# Patient Record
Sex: MALE | Race: WHITE | NOT HISPANIC OR LATINO | URBAN - METROPOLITAN AREA
[De-identification: names, ages, dates, MRNs, and addresses within clinical notes are randomized per-mention and may not be internally consistent; named-entity substitution may affect disease eponyms.]

---

## 2017-10-05 ENCOUNTER — APPOINTMENT (RX ONLY)
Dept: URBAN - METROPOLITAN AREA CLINIC 343 | Facility: CLINIC | Age: 82
Setting detail: DERMATOLOGY
End: 2017-10-05

## 2017-10-05 DIAGNOSIS — L82.1 OTHER SEBORRHEIC KERATOSIS: ICD-10-CM

## 2017-10-05 DIAGNOSIS — D18.0 HEMANGIOMA: ICD-10-CM

## 2017-10-05 DIAGNOSIS — L81.4 OTHER MELANIN HYPERPIGMENTATION: ICD-10-CM

## 2017-10-05 DIAGNOSIS — L57.0 ACTINIC KERATOSIS: ICD-10-CM

## 2017-10-05 DIAGNOSIS — D22 MELANOCYTIC NEVI: ICD-10-CM

## 2017-10-05 PROBLEM — D18.01 HEMANGIOMA OF SKIN AND SUBCUTANEOUS TISSUE: Status: ACTIVE | Noted: 2017-10-05

## 2017-10-05 PROBLEM — D48.5 NEOPLASM OF UNCERTAIN BEHAVIOR OF SKIN: Status: ACTIVE | Noted: 2017-10-05

## 2017-10-05 PROBLEM — D22.5 MELANOCYTIC NEVI OF TRUNK: Status: ACTIVE | Noted: 2017-10-05

## 2017-10-05 PROCEDURE — ? COUNSELING

## 2017-10-05 PROCEDURE — ? LIQUID NITROGEN

## 2017-10-05 PROCEDURE — 17003 DESTRUCT PREMALG LES 2-14: CPT

## 2017-10-05 PROCEDURE — 17000 DESTRUCT PREMALG LESION: CPT

## 2017-10-05 PROCEDURE — ? BIOPSY BY SHAVE METHOD

## 2017-10-05 PROCEDURE — 99213 OFFICE O/P EST LOW 20 MIN: CPT | Mod: 25

## 2017-10-05 PROCEDURE — 11100: CPT | Mod: 59

## 2017-10-05 ASSESSMENT — LOCATION SIMPLE DESCRIPTION DERM
LOCATION SIMPLE: NOSE
LOCATION SIMPLE: RIGHT FOREHEAD
LOCATION SIMPLE: CHEST
LOCATION SIMPLE: LEFT UPPER BACK
LOCATION SIMPLE: LEFT CHEEK

## 2017-10-05 ASSESSMENT — LOCATION DETAILED DESCRIPTION DERM
LOCATION DETAILED: LEFT MEDIAL SUPERIOR CHEST
LOCATION DETAILED: LEFT SUPERIOR UPPER BACK
LOCATION DETAILED: RIGHT FOREHEAD
LOCATION DETAILED: STERNUM
LOCATION DETAILED: LEFT MEDIAL INFERIOR CHEST
LOCATION DETAILED: LEFT LATERAL MANDIBULAR CHEEK
LOCATION DETAILED: NASAL INFRATIP
LOCATION DETAILED: LEFT CENTRAL MALAR CHEEK

## 2017-10-05 ASSESSMENT — LOCATION ZONE DERM
LOCATION ZONE: FACE
LOCATION ZONE: NOSE
LOCATION ZONE: TRUNK

## 2017-10-05 NOTE — PROCEDURE: LIQUID NITROGEN
Duration Of Freeze Thaw-Cycle (Seconds): 3
Render Post-Care Instructions In Note?: no
Post-Care Instructions: I reviewed with the patient in detail post-care instructions. Patient is to wear sunprotection, and avoid picking at any of the treated lesions. Pt may apply Vaseline to crusted or scabbing areas.
Number Of Freeze-Thaw Cycles: 3 freeze-thaw cycles
Consent: The patient's consent was obtained including but not limited to risks of crusting, scabbing, blistering, scarring, darker or lighter pigmentary change, recurrence, incomplete removal and infection.
Detail Level: Detailed

## 2017-10-05 NOTE — PROCEDURE: BIOPSY BY SHAVE METHOD
Post-Care Instructions: I reviewed with the patient in detail post-care instructions. Patient is to keep the biopsy site dry overnight, and then apply Aquaphor and a new bandage BID

## 2017-11-16 ENCOUNTER — IOP CHECK (OUTPATIENT)
Dept: URBAN - METROPOLITAN AREA CLINIC 43 | Facility: CLINIC | Age: 82
End: 2017-11-16

## 2017-11-16 DIAGNOSIS — H40.1131: ICD-10-CM

## 2017-11-16 PROCEDURE — 92083 EXTENDED VISUAL FIELD XM: CPT

## 2017-11-16 ASSESSMENT — VISUAL ACUITY
OS_CC: 20/25-2
OU_CC: 20/25-2
OD_CC: 20/30+2

## 2017-11-16 ASSESSMENT — TONOMETRY
OD_IOP_MMHG: 11
OS_IOP_MMHG: 13

## 2017-12-11 ENCOUNTER — EST. PATIENT EMERGENCY (OUTPATIENT)
Dept: URBAN - METROPOLITAN AREA CLINIC 43 | Facility: CLINIC | Age: 82
End: 2017-12-11

## 2017-12-11 DIAGNOSIS — H01.003: ICD-10-CM

## 2017-12-11 DIAGNOSIS — H00.023: ICD-10-CM

## 2017-12-11 DIAGNOSIS — H01.006: ICD-10-CM

## 2017-12-11 DIAGNOSIS — H00.026: ICD-10-CM

## 2017-12-11 PROCEDURE — 99212 OFFICE O/P EST SF 10 MIN: CPT

## 2017-12-11 ASSESSMENT — VISUAL ACUITY
OD_CC: 20/40
OS_CC: 20/30-1

## 2018-10-17 ENCOUNTER — APPOINTMENT (RX ONLY)
Dept: URBAN - METROPOLITAN AREA CLINIC 343 | Facility: CLINIC | Age: 83
Setting detail: DERMATOLOGY
End: 2018-10-17

## 2018-10-17 DIAGNOSIS — L21.8 OTHER SEBORRHEIC DERMATITIS: ICD-10-CM | Status: WELL CONTROLLED

## 2018-10-17 DIAGNOSIS — D22 MELANOCYTIC NEVI: ICD-10-CM

## 2018-10-17 DIAGNOSIS — L57.0 ACTINIC KERATOSIS: ICD-10-CM

## 2018-10-17 DIAGNOSIS — L82.1 OTHER SEBORRHEIC KERATOSIS: ICD-10-CM

## 2018-10-17 DIAGNOSIS — D18.0 HEMANGIOMA: ICD-10-CM

## 2018-10-17 DIAGNOSIS — L81.4 OTHER MELANIN HYPERPIGMENTATION: ICD-10-CM

## 2018-10-17 PROBLEM — Z85.828 PERSONAL HISTORY OF OTHER MALIGNANT NEOPLASM OF SKIN: Status: ACTIVE | Noted: 2018-10-17

## 2018-10-17 PROBLEM — I10 ESSENTIAL (PRIMARY) HYPERTENSION: Status: ACTIVE | Noted: 2018-10-17

## 2018-10-17 PROBLEM — E78.5 HYPERLIPIDEMIA, UNSPECIFIED: Status: ACTIVE | Noted: 2018-10-17

## 2018-10-17 PROBLEM — D18.01 HEMANGIOMA OF SKIN AND SUBCUTANEOUS TISSUE: Status: ACTIVE | Noted: 2018-10-17

## 2018-10-17 PROBLEM — I25.10 ATHEROSCLEROTIC HEART DISEASE OF NATIVE CORONARY ARTERY WITHOUT ANGINA PECTORIS: Status: ACTIVE | Noted: 2018-10-17

## 2018-10-17 PROBLEM — L85.3 XEROSIS CUTIS: Status: ACTIVE | Noted: 2018-10-17

## 2018-10-17 PROBLEM — N40.0 BENIGN PROSTATIC HYPERPLASIA WITHOUT LOWER URINARY TRACT SYMPTOMS: Status: ACTIVE | Noted: 2018-10-17

## 2018-10-17 PROBLEM — L29.8 OTHER PRURITUS: Status: ACTIVE | Noted: 2018-10-17

## 2018-10-17 PROBLEM — D48.5 NEOPLASM OF UNCERTAIN BEHAVIOR OF SKIN: Status: ACTIVE | Noted: 2018-10-17

## 2018-10-17 PROBLEM — D22.5 MELANOCYTIC NEVI OF TRUNK: Status: ACTIVE | Noted: 2018-10-17

## 2018-10-17 PROBLEM — K21.9 GASTRO-ESOPHAGEAL REFLUX DISEASE WITHOUT ESOPHAGITIS: Status: ACTIVE | Noted: 2018-10-17

## 2018-10-17 PROCEDURE — ? TREATMENT REGIMEN

## 2018-10-17 PROCEDURE — 99214 OFFICE O/P EST MOD 30 MIN: CPT | Mod: 25

## 2018-10-17 PROCEDURE — 17003 DESTRUCT PREMALG LES 2-14: CPT

## 2018-10-17 PROCEDURE — 17000 DESTRUCT PREMALG LESION: CPT

## 2018-10-17 PROCEDURE — ? PRESCRIPTION

## 2018-10-17 PROCEDURE — 11100: CPT | Mod: 59

## 2018-10-17 PROCEDURE — ? COUNSELING

## 2018-10-17 PROCEDURE — ? BIOPSY BY SHAVE METHOD

## 2018-10-17 PROCEDURE — ? LIQUID NITROGEN

## 2018-10-17 RX ORDER — HYDROCORTISONE 25 MG/ML
LOTION TOPICAL QD
Qty: 1 | Refills: 3 | Status: ERX

## 2018-10-17 ASSESSMENT — LOCATION DETAILED DESCRIPTION DERM
LOCATION DETAILED: LEFT FOREHEAD
LOCATION DETAILED: INFERIOR MID FOREHEAD
LOCATION DETAILED: RIGHT SUPERIOR MEDIAL UPPER BACK
LOCATION DETAILED: LEFT SUPERIOR FOREHEAD
LOCATION DETAILED: RIGHT SUPERIOR MEDIAL MIDBACK
LOCATION DETAILED: RIGHT CENTRAL MALAR CHEEK
LOCATION DETAILED: LEFT CENTRAL MALAR CHEEK
LOCATION DETAILED: RIGHT INFERIOR UPPER BACK
LOCATION DETAILED: LEFT SUPERIOR MEDIAL FOREHEAD
LOCATION DETAILED: SUPERIOR MID FOREHEAD
LOCATION DETAILED: RIGHT MID-UPPER BACK

## 2018-10-17 ASSESSMENT — LOCATION ZONE DERM
LOCATION ZONE: TRUNK
LOCATION ZONE: FACE

## 2018-10-17 ASSESSMENT — LOCATION SIMPLE DESCRIPTION DERM
LOCATION SIMPLE: RIGHT LOWER BACK
LOCATION SIMPLE: LEFT CHEEK
LOCATION SIMPLE: INFERIOR FOREHEAD
LOCATION SIMPLE: RIGHT UPPER BACK
LOCATION SIMPLE: SUPERIOR FOREHEAD
LOCATION SIMPLE: LEFT FOREHEAD
LOCATION SIMPLE: RIGHT CHEEK

## 2022-07-08 NOTE — PROCEDURE: TREATMENT REGIMEN
70
Initiate Treatment: Hydrocortisone 2.5% Lotion to apply as needed to cheeks and forehead (patient requested refill)
Detail Level: Zone

## 2022-10-11 ENCOUNTER — OFFICE VISIT (OUTPATIENT)
Dept: PRIMARY CARE CLINIC | Age: 87
End: 2022-10-11

## 2022-10-11 VITALS
OXYGEN SATURATION: 96 % | HEART RATE: 81 BPM | HEIGHT: 68 IN | WEIGHT: 157 LBS | RESPIRATION RATE: 16 BRPM | BODY MASS INDEX: 23.79 KG/M2 | DIASTOLIC BLOOD PRESSURE: 84 MMHG | SYSTOLIC BLOOD PRESSURE: 136 MMHG

## 2022-10-11 DIAGNOSIS — I73.9 PVD (PERIPHERAL VASCULAR DISEASE) WITH CLAUDICATION (HCC): ICD-10-CM

## 2022-10-11 DIAGNOSIS — N40.0 BENIGN PROSTATIC HYPERPLASIA WITHOUT LOWER URINARY TRACT SYMPTOMS: ICD-10-CM

## 2022-10-11 DIAGNOSIS — I65.23 BILATERAL CAROTID ARTERY STENOSIS: ICD-10-CM

## 2022-10-11 DIAGNOSIS — I25.10 CORONARY ARTERY DISEASE INVOLVING NATIVE CORONARY ARTERY OF NATIVE HEART WITHOUT ANGINA PECTORIS: ICD-10-CM

## 2022-10-11 DIAGNOSIS — I25.10 CORONARY ARTERY DISEASE INVOLVING NATIVE CORONARY ARTERY OF NATIVE HEART WITHOUT ANGINA PECTORIS: Primary | ICD-10-CM

## 2022-10-11 DIAGNOSIS — K21.9 GASTROESOPHAGEAL REFLUX DISEASE WITHOUT ESOPHAGITIS: ICD-10-CM

## 2022-10-11 LAB
A/G RATIO: 1.5 (ref 1.1–2.2)
ALBUMIN SERPL-MCNC: 4.4 G/DL (ref 3.4–5)
ALP BLD-CCNC: 108 U/L (ref 40–129)
ALT SERPL-CCNC: 10 U/L (ref 10–40)
ANION GAP SERPL CALCULATED.3IONS-SCNC: 11 MMOL/L (ref 3–16)
AST SERPL-CCNC: 11 U/L (ref 15–37)
BASOPHILS ABSOLUTE: 0 K/UL (ref 0–0.2)
BASOPHILS RELATIVE PERCENT: 0.5 %
BILIRUB SERPL-MCNC: 0.6 MG/DL (ref 0–1)
BUN BLDV-MCNC: 22 MG/DL (ref 7–20)
CALCIUM SERPL-MCNC: 10 MG/DL (ref 8.3–10.6)
CHLORIDE BLD-SCNC: 103 MMOL/L (ref 99–110)
CO2: 26 MMOL/L (ref 21–32)
CREAT SERPL-MCNC: 1.2 MG/DL (ref 0.8–1.3)
EOSINOPHILS ABSOLUTE: 0.2 K/UL (ref 0–0.6)
EOSINOPHILS RELATIVE PERCENT: 2.2 %
GFR AFRICAN AMERICAN: >60
GFR NON-AFRICAN AMERICAN: 57
GLUCOSE BLD-MCNC: 102 MG/DL (ref 70–99)
HCT VFR BLD CALC: 43.4 % (ref 40.5–52.5)
HEMOGLOBIN: 14.5 G/DL (ref 13.5–17.5)
LYMPHOCYTES ABSOLUTE: 1.8 K/UL (ref 1–5.1)
LYMPHOCYTES RELATIVE PERCENT: 20.4 %
MCH RBC QN AUTO: 29.8 PG (ref 26–34)
MCHC RBC AUTO-ENTMCNC: 33.3 G/DL (ref 31–36)
MCV RBC AUTO: 89.4 FL (ref 80–100)
MONOCYTES ABSOLUTE: 1 K/UL (ref 0–1.3)
MONOCYTES RELATIVE PERCENT: 11.3 %
NEUTROPHILS ABSOLUTE: 5.7 K/UL (ref 1.7–7.7)
NEUTROPHILS RELATIVE PERCENT: 65.6 %
PDW BLD-RTO: 14.1 % (ref 12.4–15.4)
PLATELET # BLD: 190 K/UL (ref 135–450)
PMV BLD AUTO: 9.8 FL (ref 5–10.5)
POTASSIUM SERPL-SCNC: 4.8 MMOL/L (ref 3.5–5.1)
RBC # BLD: 4.86 M/UL (ref 4.2–5.9)
SODIUM BLD-SCNC: 140 MMOL/L (ref 136–145)
TOTAL PROTEIN: 7.3 G/DL (ref 6.4–8.2)
TSH REFLEX: 2.62 UIU/ML (ref 0.27–4.2)
WBC # BLD: 8.7 K/UL (ref 4–11)

## 2022-10-11 PROCEDURE — 99204 OFFICE O/P NEW MOD 45 MIN: CPT | Performed by: FAMILY MEDICINE

## 2022-10-11 PROCEDURE — 1123F ACP DISCUSS/DSCN MKR DOCD: CPT | Performed by: FAMILY MEDICINE

## 2022-10-11 RX ORDER — EVENING PRIMROSE OIL 500 MG
400 CAPSULE ORAL EVERY OTHER DAY
COMMUNITY

## 2022-10-11 RX ORDER — CLOPIDOGREL BISULFATE 75 MG/1
TABLET ORAL
Qty: 30 TABLET | Refills: 3 | Status: SHIPPED | OUTPATIENT
Start: 2022-10-11 | End: 2022-11-04 | Stop reason: SDUPTHER

## 2022-10-11 RX ORDER — NITROGLYCERIN 0.4 MG/1
TABLET SUBLINGUAL
COMMUNITY

## 2022-10-11 RX ORDER — CILOSTAZOL 50 MG/1
50 TABLET ORAL
Qty: 60 TABLET | Refills: 3 | Status: SHIPPED | OUTPATIENT
Start: 2022-10-11 | End: 2022-11-04 | Stop reason: SDUPTHER

## 2022-10-11 RX ORDER — FAMOTIDINE 20 MG/1
TABLET, FILM COATED ORAL
COMMUNITY
Start: 2022-08-08 | End: 2022-11-02 | Stop reason: SDUPTHER

## 2022-10-11 RX ORDER — TAMSULOSIN HYDROCHLORIDE 0.4 MG/1
0.4 CAPSULE ORAL 2 TIMES DAILY
COMMUNITY
Start: 2022-06-30 | End: 2022-10-20 | Stop reason: SDUPTHER

## 2022-10-11 RX ORDER — CARVEDILOL 12.5 MG/1
TABLET ORAL
COMMUNITY
Start: 2022-06-16

## 2022-10-11 RX ORDER — FINASTERIDE 5 MG/1
TABLET, FILM COATED ORAL
COMMUNITY
Start: 2022-06-02

## 2022-10-11 RX ORDER — UBIDECARENONE 100 MG
200 CAPSULE ORAL DAILY
COMMUNITY

## 2022-10-11 RX ORDER — CILOSTAZOL 50 MG/1
50 TABLET ORAL
COMMUNITY
Start: 2021-08-20 | End: 2022-10-11 | Stop reason: SDUPTHER

## 2022-10-11 RX ORDER — CLOPIDOGREL BISULFATE 75 MG/1
TABLET ORAL
COMMUNITY
Start: 2022-09-27 | End: 2022-10-11 | Stop reason: SDUPTHER

## 2022-10-11 SDOH — ECONOMIC STABILITY: FOOD INSECURITY: WITHIN THE PAST 12 MONTHS, YOU WORRIED THAT YOUR FOOD WOULD RUN OUT BEFORE YOU GOT MONEY TO BUY MORE.: NEVER TRUE

## 2022-10-11 SDOH — ECONOMIC STABILITY: FOOD INSECURITY: WITHIN THE PAST 12 MONTHS, THE FOOD YOU BOUGHT JUST DIDN'T LAST AND YOU DIDN'T HAVE MONEY TO GET MORE.: NEVER TRUE

## 2022-10-11 ASSESSMENT — ENCOUNTER SYMPTOMS
DIARRHEA: 0
ABDOMINAL PAIN: 0
SORE THROAT: 0
SHORTNESS OF BREATH: 0
VOMITING: 0
TROUBLE SWALLOWING: 1
NAUSEA: 0
BLOOD IN STOOL: 0
COUGH: 0

## 2022-10-11 ASSESSMENT — PATIENT HEALTH QUESTIONNAIRE - PHQ9
SUM OF ALL RESPONSES TO PHQ QUESTIONS 1-9: 0
SUM OF ALL RESPONSES TO PHQ9 QUESTIONS 1 & 2: 0
2. FEELING DOWN, DEPRESSED OR HOPELESS: 0
SUM OF ALL RESPONSES TO PHQ QUESTIONS 1-9: 0
1. LITTLE INTEREST OR PLEASURE IN DOING THINGS: 0

## 2022-10-11 ASSESSMENT — SOCIAL DETERMINANTS OF HEALTH (SDOH): HOW HARD IS IT FOR YOU TO PAY FOR THE VERY BASICS LIKE FOOD, HOUSING, MEDICAL CARE, AND HEATING?: NOT HARD AT ALL

## 2022-10-11 NOTE — PROGRESS NOTES
Chief Complaint   Patient presents with    New Patient     NP to establish           Richelle Console is a 81 y.o. male who presents to be established    Patient has a past medical history significant for HTN and CAD s/p stenting 2006/2008 and is followed by Cardiology and is on coreg 12.5 mg BID along with plavix 75 mg daily. Pt is intolerable to statin medication. Pt reports a recent 2D Echo    Pt has a hx of PVD and is on pletal medication and is followed by Vascular Surgery    Pt has a hx of carotid stenosis s/p L carotid endarterectomy 3 years ago    Pt has a hx of BPH and is on proscar and flomax medications    Pt is on pepcid for GERD    Pt reports a normal colonoscopy except for a benign polyp 15 years ago    Pt denies any hx of diabestes, CHF or asthma    Surgical hx includes tonsillectomy and appendectomy    Pt did not get his COVID vaccine yet but did have COVID last year with mild symptoms    Pt is an exsmoker Quit 0817 and uses alcohol occasionally and denies any illegal drug use    FHx positive for colon cancer (mother) and lung cancer (father)    Review of Systems   Constitutional:  Negative for fatigue and fever. HENT:  Positive for trouble swallowing (at times and had endoscopy done which showed a stricture). Negative for congestion, nosebleeds and sore throat. Eyes:         Negative blurred vision or diplopia   Respiratory:  Negative for cough and shortness of breath. Cardiovascular:  Positive for leg swelling (ankle area at times). Negative for chest pain and palpitations. Positive intermittent claudication B/L legs at 1 mile   Gastrointestinal:  Negative for abdominal pain, blood in stool, diarrhea, nausea and vomiting. Negative melena; Positive indigestion at times   Endocrine: Negative for polydipsia and polyuria. Genitourinary:  Negative for dysuria and hematuria. Negative nocturia or hesitancy   Musculoskeletal:  Negative for arthralgias.    Skin:  Negative for rash.   Neurological:  Negative for dizziness, seizures, syncope, speech difficulty, weakness and headaches. Psychiatric/Behavioral:  Negative for dysphoric mood. The patient is not nervous/anxious. Vitals:    10/11/22 0757   BP: 136/84   Pulse: 81   Resp: 16   SpO2: 96%         Physical Exam  Vitals reviewed. Constitutional:       General: He is not in acute distress. HENT:      Mouth/Throat:      Mouth: Mucous membranes are moist.      Pharynx: Oropharynx is clear. Eyes:      General: No scleral icterus. Comments: Pink conjunctivae    Neck:      Thyroid: No thyromegaly. Cardiovascular:      Heart sounds: Murmur (2/6 systolic with radiation into B/L carotids) heard. No friction rub. No gallop. Pulmonary:      Effort: Pulmonary effort is normal.      Breath sounds: Normal breath sounds. Abdominal:      Palpations: Abdomen is soft. Tenderness: There is no abdominal tenderness. Musculoskeletal:      Comments: No leg edema noted B/L   Lymphadenopathy:      Cervical: No cervical adenopathy. Skin:     Findings: No rash. Neurological:      Mental Status: He is alert. Comments: Cranial nerves 2 - 12 grossly intact; Muscle strength 5/5 throughout   Psychiatric:         Mood and Affect: Mood normal.       ASSESSMENT AND PLAN    1. Coronary artery disease involving native coronary artery of native heart without angina pectoris  Pt is followed by Cardiology and is clinically stable on present medications and denies any CP or SOB and need to obtain recent correspondence and 2D Echo report for review  - clopidogrel (PLAVIX) 75 MG tablet; clopidogrel 75 mg tablet  Dispense: 30 tablet; Refill: 3  - Comprehensive Metabolic Panel; Future  - TSH with Reflex; Future    2. PVD (peripheral vascular disease) with claudication (HCC)  Pt is followed by Vascular Surgery and is clinically stable on present medications and will check basic bloodwork  - cilostazol (PLETAL) 50 MG tablet;  Take 1 tablet by mouth 2 times daily (before meals)  Dispense: 60 tablet; Refill: 3  - CBC with Auto Differential; Future  - Comprehensive Metabolic Panel; Future  - TSH with Reflex; Future    3. Bilateral carotid artery stenosis  Patient s/p L carotid endarterectomy and is clinically stable on present medications and with a nonfocal neuro exam  - clopidogrel (PLAVIX) 75 MG tablet; clopidogrel 75 mg tablet  Dispense: 30 tablet; Refill: 3    4. Benign prostatic hyperplasia without lower urinary tract symptoms  Patient clinically stable on present medications    5. Gastroesophageal reflux disease without esophagitis  Patient clinically stable on present medications and is with a nontender abdomen on PE  - CBC with Auto Differential; Future      Moiz Alexis MD      Return in about 2 months (around 12/11/2022). Patient Instructions     Go to the ER ASAP if you develop any chest pain, shortness of breath, facial droop, slurred speech, weakness/numbness in your arms or legs or double vision!

## 2022-10-20 RX ORDER — TAMSULOSIN HYDROCHLORIDE 0.4 MG/1
0.4 CAPSULE ORAL 2 TIMES DAILY
Qty: 180 CAPSULE | Refills: 0 | Status: SHIPPED | OUTPATIENT
Start: 2022-10-20

## 2022-10-20 NOTE — TELEPHONE ENCOUNTER
Medication:   Requested Prescriptions     Pending Prescriptions Disp Refills    tamsulosin (FLOMAX) 0.4 MG capsule 180 capsule 0     Sig: Take 1 capsule by mouth in the morning and at bedtime        Last Filled:  06.30.2022 - historical listing    Patient Phone Number: [phone] (home)     Last appt: 10.11.2022  Next appt: 12.13.2022    Last OARRS: No flowsheet data found.

## 2022-11-02 RX ORDER — FAMOTIDINE 20 MG/1
20 TABLET, FILM COATED ORAL 2 TIMES DAILY
Qty: 60 TABLET | Refills: 2 | Status: SHIPPED | OUTPATIENT
Start: 2022-11-02

## 2022-11-02 NOTE — TELEPHONE ENCOUNTER
Medication:   Requested Prescriptions     Pending Prescriptions Disp Refills    famotidine (PEPCID) 20 MG tablet 60 tablet         Last Filled:  hist provider     Patient Phone Number: [phone] (home)     Last appt: 10/11/2022  Next appt: 12/13/2022    Last OARRS: No flowsheet data found.

## 2022-11-04 DIAGNOSIS — I65.23 BILATERAL CAROTID ARTERY STENOSIS: ICD-10-CM

## 2022-11-04 DIAGNOSIS — I73.9 PVD (PERIPHERAL VASCULAR DISEASE) WITH CLAUDICATION (HCC): ICD-10-CM

## 2022-11-04 DIAGNOSIS — I25.10 CORONARY ARTERY DISEASE INVOLVING NATIVE CORONARY ARTERY OF NATIVE HEART WITHOUT ANGINA PECTORIS: ICD-10-CM

## 2022-11-04 RX ORDER — CLOPIDOGREL BISULFATE 75 MG/1
TABLET ORAL
Qty: 90 TABLET | Refills: 1 | Status: SHIPPED | OUTPATIENT
Start: 2022-11-04

## 2022-11-04 RX ORDER — CILOSTAZOL 50 MG/1
50 TABLET ORAL
Qty: 180 TABLET | Refills: 1 | Status: SHIPPED | OUTPATIENT
Start: 2022-11-04

## 2022-11-04 NOTE — TELEPHONE ENCOUNTER
Medication:   Requested Prescriptions     Pending Prescriptions Disp Refills    clopidogrel (PLAVIX) 75 MG tablet 90 tablet 3     Sig: clopidogrel 75 mg tablet        Last Filled:  10/11/2022 - pt requesting 90 day prescription    Patient Phone Number: [phone] (home)     Last appt: Visit date not found   Next appt: Visit date not found    Last OARRS: No flowsheet data found.

## 2022-11-09 ENCOUNTER — TELEPHONE (OUTPATIENT)
Dept: PRIMARY CARE CLINIC | Age: 87
End: 2022-11-09

## 2022-11-09 NOTE — TELEPHONE ENCOUNTER
Monica Shoemaker with [facility] ([phone]) is calling to get clarification on the directions for medication below:    clopidogrel (PLAVIX) 75 MG tablet  (No Directions?)      Please clarify frequency

## 2022-12-13 ENCOUNTER — OFFICE VISIT (OUTPATIENT)
Dept: PRIMARY CARE CLINIC | Age: 87
End: 2022-12-13
Payer: COMMERCIAL

## 2022-12-13 VITALS
BODY MASS INDEX: 23.64 KG/M2 | HEART RATE: 80 BPM | RESPIRATION RATE: 16 BRPM | HEIGHT: 68 IN | OXYGEN SATURATION: 98 % | DIASTOLIC BLOOD PRESSURE: 70 MMHG | SYSTOLIC BLOOD PRESSURE: 140 MMHG | TEMPERATURE: 97 F | WEIGHT: 156 LBS

## 2022-12-13 DIAGNOSIS — H93.13 TINNITUS OF BOTH EARS: ICD-10-CM

## 2022-12-13 DIAGNOSIS — K21.9 GASTROESOPHAGEAL REFLUX DISEASE WITHOUT ESOPHAGITIS: ICD-10-CM

## 2022-12-13 DIAGNOSIS — I73.9 PVD (PERIPHERAL VASCULAR DISEASE) WITH CLAUDICATION (HCC): ICD-10-CM

## 2022-12-13 DIAGNOSIS — N40.0 BENIGN PROSTATIC HYPERPLASIA WITHOUT LOWER URINARY TRACT SYMPTOMS: ICD-10-CM

## 2022-12-13 DIAGNOSIS — I25.10 CORONARY ARTERY DISEASE INVOLVING NATIVE CORONARY ARTERY OF NATIVE HEART WITHOUT ANGINA PECTORIS: ICD-10-CM

## 2022-12-13 DIAGNOSIS — I65.23 BILATERAL CAROTID ARTERY STENOSIS: Primary | ICD-10-CM

## 2022-12-13 PROCEDURE — 1123F ACP DISCUSS/DSCN MKR DOCD: CPT | Performed by: FAMILY MEDICINE

## 2022-12-13 PROCEDURE — 99214 OFFICE O/P EST MOD 30 MIN: CPT | Performed by: FAMILY MEDICINE

## 2022-12-13 ASSESSMENT — ENCOUNTER SYMPTOMS
VOMITING: 0
BLOOD IN STOOL: 0
COUGH: 0
TROUBLE SWALLOWING: 1
SHORTNESS OF BREATH: 0
ABDOMINAL PAIN: 0
NAUSEA: 0
SORE THROAT: 0

## 2022-12-13 NOTE — PROGRESS NOTES
Chief Complaint   Patient presents with    Follow-up     Pt is here for 2 month f/u          Urszula Ruano is a 81 y.o. male who presents for routine visit    Patient has a past medical history significant for HTN and CAD s/p stenting 2006/2008 and is followed by Cardiology and is on coreg 12.5 mg BID along with plavix 75 mg daily. Pt is intolerable to statin medication. Pt reports a recent 2D Echo and had a cardiac stress test 09/2021 which was normal    Patient did recent bloodwork [CMP, TSH, CBC] which was within normal limits except for signs of dehydration and patient thus needs to drink at least 6 -8 glasses of water daily and will repeat BMP bloodwork at next office visit     Pt has a hx of PVD and is on pletal medication and is followed by Vascular Surgery     Pt has a hx of carotid stenosis s/p L carotid endarterectomy 3 years ago     Pt has a hx of BPH and is on proscar and flomax medications     Pt is on pepcid for GERD     Pt reports a normal colonoscopy except for a benign polyp 15 years ago     Pt denies any hx of diabestes, CHF or asthma     Surgical hx includes tonsillectomy and appendectomy     Pt did not get his COVID vaccine yet but did have COVID last year with mild symptoms     Pt is an exsmoker Quit 1976 and uses alcohol occasionally and denies any illegal drug use     FHx positive for colon cancer (mother) and lung cancer (father)        Review of Systems   Constitutional:  Negative for fatigue and fever. HENT:  Positive for trouble swallowing (at times and had endoscopy done which showed a stricture). Negative for congestion, nosebleeds and sore throat. Eyes:         Negative blurred vision or diplopia   Respiratory:  Negative for cough and shortness of breath. Cardiovascular:  Positive for chest pain (sharp and brief at times) and leg swelling (ankle area at times). Negative for palpitations.         Positive intermittent claudication B/L legs at 1 mile   Gastrointestinal:  Negative for abdominal pain, blood in stool, nausea and vomiting. Negative melena; Positive indigestion at times   Endocrine: Negative for polydipsia and polyuria. Genitourinary:  Negative for dysuria and hematuria. Negative nocturia or hesitancy   Musculoskeletal:  Negative for arthralgias. Skin:  Positive for rash (dermatitis on face and uses Rx steroid cream as needed). Neurological:  Negative for dizziness, seizures, syncope, speech difficulty, weakness and headaches. Psychiatric/Behavioral:  Negative for dysphoric mood. The patient is not nervous/anxious. Vitals:    12/13/22 1114   BP: (!) 140/70   Pulse: 80   Resp: 16   Temp: 97 °F (36.1 °C)   SpO2: 98%         Physical Exam  Vitals reviewed. Constitutional:       General: He is not in acute distress. HENT:      Mouth/Throat:      Mouth: Mucous membranes are moist.      Pharynx: Oropharynx is clear. Eyes:      General: No scleral icterus. Comments: Pink conjunctivae    Neck:      Thyroid: No thyromegaly. Cardiovascular:      Heart sounds: Murmur (2/6 systolic with radiation into B/L carotids) heard. No friction rub. No gallop. Pulmonary:      Effort: Pulmonary effort is normal.      Breath sounds: Normal breath sounds. Abdominal:      Palpations: Abdomen is soft. Tenderness: There is no abdominal tenderness. Musculoskeletal:      Comments: No leg edema noted B/L   Lymphadenopathy:      Cervical: No cervical adenopathy. Skin:     Findings: No rash. Neurological:      Mental Status: He is alert. Comments: Cranial nerves 2 - 12 grossly intact; Muscle strength 5/5 throughout   Psychiatric:         Mood and Affect: Mood normal.       ASSESSMENT AND PLAN    1. Bilateral carotid artery stenosis  Patient s/p L carotid endarterectomy and is clinically stable on present medications and with a nonfocal neuro exam    2.  Coronary artery disease involving native coronary artery of native heart without angina pectoris  Pt is followed by Cardiology and is clinically stable on present medication and had a cardiac stress test 09/2021 which was normal  - Basic Metabolic Panel; Future    3. PVD (peripheral vascular disease) with claudication (HCC)  Pt is followed by Vascular Surgery and is clinically stable on present medications and had recent normal CBC bloodwork    4. Gastroesophageal reflux disease without esophagitis  Patient clinically stable on present medications and is with a nontender abdomen on PE    5. Benign prostatic hyperplasia without lower urinary tract symptoms  Patient clinically stable on present medications    6. Tinnitus of both ears  Will Refer pt to ENT for evaluation  - Vira Prado DO, Otolaryngology, Rodri Gil MD      Return in about 3 months (around 3/13/2023). Patient Instructions     Go to the ER ASAP if you develop any chest pain, shortness of breath, facial droop, slurred speech, weakness/numbness in your arms or legs or double vision!

## 2023-01-03 RX ORDER — CARVEDILOL 12.5 MG/1
12.5 TABLET ORAL 2 TIMES DAILY
Qty: 180 TABLET | Refills: 1 | Status: SHIPPED | OUTPATIENT
Start: 2023-01-03

## 2023-01-03 RX ORDER — FINASTERIDE 5 MG/1
5 TABLET, FILM COATED ORAL DAILY
Qty: 90 TABLET | Refills: 1 | Status: SHIPPED | OUTPATIENT
Start: 2023-01-03

## 2023-01-03 NOTE — TELEPHONE ENCOUNTER
Medication:   Requested Prescriptions     Pending Prescriptions Disp Refills    carvedilol (COREG) 12.5 MG tablet 180 tablet 1     Sig: Take 1 tablet by mouth 2 times daily    finasteride (PROSCAR) 5 MG tablet 90 tablet 1     Sig: Take 1 tablet by mouth daily        Last Filled:  6/16/2022 and 6/2/2022 from previous provider    Patient Phone Number: [phone] (home)     Last appt: 12/13/2022   Next appt: 3/14/2023    Last OARRS: No flowsheet data found.

## 2023-01-09 ENCOUNTER — OFFICE VISIT (OUTPATIENT)
Dept: PRIMARY CARE CLINIC | Age: 88
End: 2023-01-09
Payer: MEDICARE

## 2023-01-09 VITALS
WEIGHT: 166 LBS | TEMPERATURE: 97.9 F | DIASTOLIC BLOOD PRESSURE: 70 MMHG | HEART RATE: 74 BPM | BODY MASS INDEX: 25.16 KG/M2 | HEIGHT: 68 IN | SYSTOLIC BLOOD PRESSURE: 132 MMHG | OXYGEN SATURATION: 96 % | RESPIRATION RATE: 18 BRPM

## 2023-01-09 DIAGNOSIS — K21.9 GASTROESOPHAGEAL REFLUX DISEASE WITHOUT ESOPHAGITIS: Primary | ICD-10-CM

## 2023-01-09 DIAGNOSIS — R10.13 EPIGASTRIC PAIN: ICD-10-CM

## 2023-01-09 PROCEDURE — G8427 DOCREV CUR MEDS BY ELIG CLIN: HCPCS | Performed by: FAMILY MEDICINE

## 2023-01-09 PROCEDURE — G8417 CALC BMI ABV UP PARAM F/U: HCPCS | Performed by: FAMILY MEDICINE

## 2023-01-09 PROCEDURE — G8484 FLU IMMUNIZE NO ADMIN: HCPCS | Performed by: FAMILY MEDICINE

## 2023-01-09 PROCEDURE — 99213 OFFICE O/P EST LOW 20 MIN: CPT | Performed by: FAMILY MEDICINE

## 2023-01-09 PROCEDURE — 1123F ACP DISCUSS/DSCN MKR DOCD: CPT | Performed by: FAMILY MEDICINE

## 2023-01-09 PROCEDURE — 1036F TOBACCO NON-USER: CPT | Performed by: FAMILY MEDICINE

## 2023-01-09 RX ORDER — LANSOPRAZOLE 30 MG/1
30 CAPSULE, DELAYED RELEASE ORAL DAILY
Qty: 30 CAPSULE | Refills: 1 | Status: SHIPPED | OUTPATIENT
Start: 2023-01-09

## 2023-01-09 RX ORDER — LANSOPRAZOLE 30 MG/1
30 CAPSULE, DELAYED RELEASE ORAL DAILY
Qty: 30 CAPSULE | Refills: 3 | Status: CANCELLED | OUTPATIENT
Start: 2023-01-09

## 2023-01-09 ASSESSMENT — ENCOUNTER SYMPTOMS
SHORTNESS OF BREATH: 0
COUGH: 1
ABDOMINAL PAIN: 0
VOMITING: 0
SORE THROAT: 0
TROUBLE SWALLOWING: 1
NAUSEA: 0
BLOOD IN STOOL: 0

## 2023-01-09 ASSESSMENT — PATIENT HEALTH QUESTIONNAIRE - PHQ9
SUM OF ALL RESPONSES TO PHQ QUESTIONS 1-9: 0
2. FEELING DOWN, DEPRESSED OR HOPELESS: 0
SUM OF ALL RESPONSES TO PHQ QUESTIONS 1-9: 0
SUM OF ALL RESPONSES TO PHQ9 QUESTIONS 1 & 2: 0
1. LITTLE INTEREST OR PLEASURE IN DOING THINGS: 0
SUM OF ALL RESPONSES TO PHQ QUESTIONS 1-9: 0
SUM OF ALL RESPONSES TO PHQ QUESTIONS 1-9: 0

## 2023-01-09 NOTE — PROGRESS NOTES
Chief Complaint   Patient presents with    Gastroesophageal Reflux     Worsening heart burn X one week         Adan Kid is a 81 y.o. male who presents complaining of indigestion daily over the past week despite taking his pepcid medication    Patient has a past medical history significant for HTN and CAD s/p stenting 2006/2008 and is followed by Cardiology and is on coreg 12.5 mg BID along with plavix 75 mg daily. Pt is intolerable to statin medication. Pt reports a recent 2D Echo and had a cardiac stress test 09/2021 which was normal     Patient did recent bloodwork [CMP, TSH, CBC] which was within normal limits except for signs of dehydration and patient thus needs to drink at least 6 -8 glasses of water daily and will repeat BMP bloodwork at next office visit     Pt has a hx of PVD and is on pletal medication and is followed by Vascular Surgery     Pt has a hx of carotid stenosis s/p L carotid endarterectomy 3 years ago     Pt has a hx of BPH and is on proscar and flomax medications     Pt is on pepcid for GERD     Pt reports a normal colonoscopy except for a benign polyp 15 years ago     Pt denies any hx of diabestes, CHF or asthma     Surgical hx includes tonsillectomy and appendectomy     Pt did not get his COVID vaccine yet but did have COVID last year with mild symptoms     Pt is an exsmoker Quit 1976 and uses alcohol occasionally and denies any illegal drug use     FHx positive for colon cancer (mother) and lung cancer (father)        Review of Systems   Constitutional:  Negative for fever. HENT:  Positive for trouble swallowing (at times and had endoscopy done which showed a stricture). Negative for nosebleeds and sore throat. Eyes:         Negative blurred vision or diplopia   Respiratory:  Positive for cough (dry). Negative for shortness of breath. Cardiovascular:  Positive for chest pain (sharp and brief at times) and leg swelling (ankle area at times). Negative for palpitations. Positive intermittent claudication B/L legs at 1 mile   Gastrointestinal:  Negative for abdominal pain, blood in stool, nausea and vomiting. Negative melena; Positive indigestion daily x 2 weeks   Genitourinary:  Positive for urgency (at times). Negative for dysuria and hematuria. Skin:  Negative for rash. Neurological:  Negative for dizziness, seizures, syncope and headaches. Psychiatric/Behavioral:  Negative for dysphoric mood. The patient is not nervous/anxious. Vitals:    01/09/23 1551   BP: 132/70   Pulse: 74   Resp: 18   Temp: 97.9 °F (36.6 °C)   SpO2: 96%         Physical Exam  Vitals reviewed. Constitutional:       General: He is not in acute distress. HENT:      Mouth/Throat:      Mouth: Mucous membranes are moist.      Pharynx: Oropharynx is clear. Eyes:      General: No scleral icterus. Comments: Pink conjunctivae    Neck:      Thyroid: No thyromegaly. Cardiovascular:      Heart sounds: Murmur (2/6 systolic with radiation into B/L carotids) heard. No friction rub. No gallop. Pulmonary:      Effort: Pulmonary effort is normal.      Breath sounds: Normal breath sounds. Abdominal:      Palpations: Abdomen is soft. Tenderness: There is abdominal tenderness (mild in midepigastric and RUQ areas). There is no guarding or rebound. Musculoskeletal:      Comments: No leg edema noted B/L   Lymphadenopathy:      Cervical: No cervical adenopathy. Skin:     Findings: No rash. Neurological:      Mental Status: He is alert. Psychiatric:         Mood and Affect: Mood normal.       ASSESSMENT AND PLAN    1. Gastroesophageal reflux disease without esophagitis/Epigastric pain  Will discontinue pepcid and treat pt with a PPI medication for better control and patient was told to notify MD if you note no improvement in 5 -7 days.  Will check an abdominal US given noted mid epigastric and RUQ tendernous to palpation on abdominal exam. VSS   - lansoprazole (PREVACID) 30 MG delayed release capsule; Take 1 capsule by mouth daily  Dispense: 30 capsule; Refill: 1  - US ABDOMEN COMPLETE; Future      Von President, MD      Return in about 2 months (around 3/9/2023). Patient Instructions     Go to the ER ASAP if you develop any vomiting or fever!

## 2023-01-26 ENCOUNTER — HOSPITAL ENCOUNTER (OUTPATIENT)
Dept: ULTRASOUND IMAGING | Age: 88
Discharge: HOME | End: 2023-01-26
Payer: MEDICARE

## 2023-01-26 DIAGNOSIS — R10.13 EPIGASTRIC PAIN: ICD-10-CM

## 2023-01-26 DIAGNOSIS — K21.9 GASTROESOPHAGEAL REFLUX DISEASE WITHOUT ESOPHAGITIS: ICD-10-CM

## 2023-01-26 PROCEDURE — 76700 US EXAM ABDOM COMPLETE: CPT

## 2023-02-21 DIAGNOSIS — I25.10 CORONARY ARTERY DISEASE INVOLVING NATIVE CORONARY ARTERY OF NATIVE HEART WITHOUT ANGINA PECTORIS: ICD-10-CM

## 2023-02-21 DIAGNOSIS — R10.13 EPIGASTRIC PAIN: ICD-10-CM

## 2023-02-21 DIAGNOSIS — K21.9 GASTROESOPHAGEAL REFLUX DISEASE WITHOUT ESOPHAGITIS: ICD-10-CM

## 2023-02-21 DIAGNOSIS — I65.23 BILATERAL CAROTID ARTERY STENOSIS: ICD-10-CM

## 2023-02-21 RX ORDER — FAMOTIDINE 20 MG/1
20 TABLET, FILM COATED ORAL 2 TIMES DAILY
Qty: 180 TABLET | Refills: 1 | OUTPATIENT
Start: 2023-02-21

## 2023-02-21 RX ORDER — TAMSULOSIN HYDROCHLORIDE 0.4 MG/1
0.4 CAPSULE ORAL 2 TIMES DAILY
Qty: 180 CAPSULE | Refills: 1 | Status: SHIPPED | OUTPATIENT
Start: 2023-02-21

## 2023-02-21 RX ORDER — CLOPIDOGREL BISULFATE 75 MG/1
TABLET ORAL
Qty: 90 TABLET | Refills: 1 | Status: SHIPPED | OUTPATIENT
Start: 2023-02-21

## 2023-02-21 RX ORDER — LANSOPRAZOLE 30 MG/1
30 CAPSULE, DELAYED RELEASE ORAL DAILY
Qty: 90 CAPSULE | Refills: 1 | Status: SHIPPED | OUTPATIENT
Start: 2023-02-21

## 2023-02-21 NOTE — TELEPHONE ENCOUNTER
Medication:   Requested Prescriptions     Pending Prescriptions Disp Refills    clopidogrel (PLAVIX) 75 MG tablet 90 tablet 1     Sig: clopidogrel 75 mg tablet    lansoprazole (PREVACID) 30 MG delayed release capsule 90 capsule 1     Sig: Take 1 capsule by mouth daily    famotidine (PEPCID) 20 MG tablet 180 tablet 1     Sig: Take 1 tablet by mouth 2 times daily    tamsulosin (FLOMAX) 0.4 MG capsule 180 capsule 0     Sig: Take 1 capsule by mouth in the morning and at bedtime        Last Filled:  pt changing to mail in    Patient Phone Number: [phone] (home)     Last appt: 1/9/2023   Next appt: 3/16/2023    Last OARRS: No flowsheet data found.

## 2023-03-15 RX ORDER — FINASTERIDE 5 MG/1
5 TABLET, FILM COATED ORAL DAILY
Qty: 100 TABLET | Refills: 1 | Status: SHIPPED | OUTPATIENT
Start: 2023-03-15

## 2023-03-15 RX ORDER — CARVEDILOL 12.5 MG/1
TABLET ORAL
Qty: 200 TABLET | Refills: 1 | Status: SHIPPED | OUTPATIENT
Start: 2023-03-15

## 2023-04-17 DIAGNOSIS — I73.9 PVD (PERIPHERAL VASCULAR DISEASE) WITH CLAUDICATION (HCC): ICD-10-CM

## 2023-04-17 RX ORDER — CILOSTAZOL 50 MG/1
50 TABLET ORAL
Qty: 180 TABLET | Refills: 1 | Status: SHIPPED | OUTPATIENT
Start: 2023-04-17

## 2023-04-17 NOTE — TELEPHONE ENCOUNTER
Medication:   Requested Prescriptions     Pending Prescriptions Disp Refills    cilostazol (PLETAL) 50 MG tablet 180 tablet 1     Sig: Take 1 tablet by mouth 2 times daily (before meals)        Last Filled:  11/4/2022, #180 with 1 refill    Patient Phone Number: [phone] (home)     Last appt: 1/9/2023   Next appt: Visit date not found    Last OARRS: No flowsheet data found.

## 2023-04-17 NOTE — TELEPHONE ENCOUNTER
Patient requesting a medication refill.     Medication CILOSTAZOL  Dosage  50 mg tab   Frequency 2 times daily before meals   Pharmacy  [facility]   Next office visit  none

## 2023-05-01 DIAGNOSIS — R10.13 EPIGASTRIC PAIN: ICD-10-CM

## 2023-05-01 DIAGNOSIS — K21.9 GASTROESOPHAGEAL REFLUX DISEASE WITHOUT ESOPHAGITIS: ICD-10-CM

## 2023-05-01 DIAGNOSIS — I25.10 CORONARY ARTERY DISEASE INVOLVING NATIVE CORONARY ARTERY OF NATIVE HEART WITHOUT ANGINA PECTORIS: ICD-10-CM

## 2023-05-01 DIAGNOSIS — I65.23 BILATERAL CAROTID ARTERY STENOSIS: ICD-10-CM

## 2023-05-01 RX ORDER — LANSOPRAZOLE 30 MG/1
30 CAPSULE, DELAYED RELEASE ORAL DAILY
Qty: 100 CAPSULE | Refills: 2 | OUTPATIENT
Start: 2023-05-01

## 2023-05-01 RX ORDER — CLOPIDOGREL BISULFATE 75 MG/1
TABLET ORAL
Qty: 100 TABLET | Refills: 2 | OUTPATIENT
Start: 2023-05-01

## 2023-05-01 RX ORDER — TAMSULOSIN HYDROCHLORIDE 0.4 MG/1
0.4 CAPSULE ORAL 2 TIMES DAILY
Qty: 200 CAPSULE | Refills: 2 | OUTPATIENT
Start: 2023-05-01

## 2023-05-04 ENCOUNTER — OFFICE VISIT (OUTPATIENT)
Dept: PRIMARY CARE CLINIC | Age: 88
End: 2023-05-04
Payer: MEDICARE

## 2023-05-04 VITALS
HEART RATE: 72 BPM | WEIGHT: 159 LBS | DIASTOLIC BLOOD PRESSURE: 60 MMHG | RESPIRATION RATE: 16 BRPM | HEIGHT: 68 IN | OXYGEN SATURATION: 96 % | BODY MASS INDEX: 24.1 KG/M2 | TEMPERATURE: 97.1 F | SYSTOLIC BLOOD PRESSURE: 98 MMHG

## 2023-05-04 DIAGNOSIS — Z00.00 WELCOME TO MEDICARE PREVENTIVE VISIT: Primary | ICD-10-CM

## 2023-05-04 DIAGNOSIS — N40.0 BENIGN PROSTATIC HYPERPLASIA WITHOUT LOWER URINARY TRACT SYMPTOMS: ICD-10-CM

## 2023-05-04 DIAGNOSIS — I25.10 CORONARY ARTERY DISEASE INVOLVING NATIVE CORONARY ARTERY OF NATIVE HEART WITHOUT ANGINA PECTORIS: ICD-10-CM

## 2023-05-04 DIAGNOSIS — I65.23 BILATERAL CAROTID ARTERY STENOSIS: ICD-10-CM

## 2023-05-04 DIAGNOSIS — I73.9 PVD (PERIPHERAL VASCULAR DISEASE) WITH CLAUDICATION (HCC): ICD-10-CM

## 2023-05-04 DIAGNOSIS — K21.9 GASTROESOPHAGEAL REFLUX DISEASE WITHOUT ESOPHAGITIS: ICD-10-CM

## 2023-05-04 PROCEDURE — 1123F ACP DISCUSS/DSCN MKR DOCD: CPT | Performed by: FAMILY MEDICINE

## 2023-05-04 PROCEDURE — G0402 INITIAL PREVENTIVE EXAM: HCPCS | Performed by: FAMILY MEDICINE

## 2023-05-04 SDOH — ECONOMIC STABILITY: FOOD INSECURITY: WITHIN THE PAST 12 MONTHS, YOU WORRIED THAT YOUR FOOD WOULD RUN OUT BEFORE YOU GOT MONEY TO BUY MORE.: NEVER TRUE

## 2023-05-04 SDOH — ECONOMIC STABILITY: HOUSING INSECURITY
IN THE LAST 12 MONTHS, WAS THERE A TIME WHEN YOU DID NOT HAVE A STEADY PLACE TO SLEEP OR SLEPT IN A SHELTER (INCLUDING NOW)?: NO

## 2023-05-04 SDOH — ECONOMIC STABILITY: FOOD INSECURITY: WITHIN THE PAST 12 MONTHS, THE FOOD YOU BOUGHT JUST DIDN'T LAST AND YOU DIDN'T HAVE MONEY TO GET MORE.: NEVER TRUE

## 2023-05-04 SDOH — ECONOMIC STABILITY: INCOME INSECURITY: HOW HARD IS IT FOR YOU TO PAY FOR THE VERY BASICS LIKE FOOD, HOUSING, MEDICAL CARE, AND HEATING?: NOT HARD AT ALL

## 2023-05-04 ASSESSMENT — ENCOUNTER SYMPTOMS
SORE THROAT: 0
SHORTNESS OF BREATH: 0
BLOOD IN STOOL: 0
VOMITING: 0
COUGH: 0
NAUSEA: 0
TROUBLE SWALLOWING: 1
ABDOMINAL PAIN: 0

## 2023-05-04 ASSESSMENT — PATIENT HEALTH QUESTIONNAIRE - PHQ9
1. LITTLE INTEREST OR PLEASURE IN DOING THINGS: 0
2. FEELING DOWN, DEPRESSED OR HOPELESS: 0
SUM OF ALL RESPONSES TO PHQ QUESTIONS 1-9: 0
SUM OF ALL RESPONSES TO PHQ QUESTIONS 1-9: 0
SUM OF ALL RESPONSES TO PHQ9 QUESTIONS 1 & 2: 0
SUM OF ALL RESPONSES TO PHQ QUESTIONS 1-9: 0
SUM OF ALL RESPONSES TO PHQ QUESTIONS 1-9: 0

## 2023-05-04 ASSESSMENT — LIFESTYLE VARIABLES
HOW OFTEN DO YOU HAVE A DRINK CONTAINING ALCOHOL: MONTHLY OR LESS
HOW MANY STANDARD DRINKS CONTAINING ALCOHOL DO YOU HAVE ON A TYPICAL DAY: 1 OR 2

## 2023-06-07 ENCOUNTER — TELEPHONE (OUTPATIENT)
Dept: PRIMARY CARE CLINIC | Age: 88
End: 2023-06-07

## 2023-06-25 DIAGNOSIS — I73.9 PVD (PERIPHERAL VASCULAR DISEASE) WITH CLAUDICATION (HCC): ICD-10-CM

## 2023-06-26 RX ORDER — CILOSTAZOL 50 MG/1
TABLET ORAL
Qty: 200 TABLET | Refills: 2 | OUTPATIENT
Start: 2023-06-26

## 2023-07-19 DIAGNOSIS — I65.23 BILATERAL CAROTID ARTERY STENOSIS: ICD-10-CM

## 2023-07-19 DIAGNOSIS — I25.10 CORONARY ARTERY DISEASE INVOLVING NATIVE CORONARY ARTERY OF NATIVE HEART WITHOUT ANGINA PECTORIS: ICD-10-CM

## 2023-07-19 RX ORDER — CLOPIDOGREL BISULFATE 75 MG/1
TABLET ORAL
Qty: 80 TABLET | Refills: 3 | OUTPATIENT
Start: 2023-07-19

## 2023-07-19 NOTE — TELEPHONE ENCOUNTER
Medication:   Requested Prescriptions     Pending Prescriptions Disp Refills    clopidogrel (PLAVIX) 75 MG tablet [Pharmacy Med Name: Clopidogrel Bisulfate 75 MG Oral Tablet] 80 tablet 3     Sig: TAKE 1 TABLET BY MOUTH DAILY        Last Filled: 02/21/2023 #90 with 1 refill     Patient Phone Number: [phone] (home)     Last appt: 5/4/2023   Next appt: 9/7/2023    Last OARRS: No flowsheet data found.

## 2023-08-21 RX ORDER — TAMSULOSIN HYDROCHLORIDE 0.4 MG/1
0.4 CAPSULE ORAL 2 TIMES DAILY
Qty: 180 CAPSULE | Refills: 1 | Status: SHIPPED | OUTPATIENT
Start: 2023-08-21

## 2023-08-21 NOTE — TELEPHONE ENCOUNTER
Medication:   Requested Prescriptions     Pending Prescriptions Disp Refills    tamsulosin (FLOMAX) 0.4 MG capsule [Pharmacy Med Name: Tamsulosin HCl 0.4 MG Oral Capsule] 160 capsule 3     Sig: TAKE 1 CAPSULE BY MOUTH IN THE  MORNING AND AT BEDTIME       Last Filled: 02/21/2023 #180 with 1 refill     Patient Phone Number: [phone] (home)     Last appt: 5/4/2023   Next appt: 9/7/2023    Last PSA: No results found for: PSA

## 2023-09-05 DIAGNOSIS — I65.23 BILATERAL CAROTID ARTERY STENOSIS: ICD-10-CM

## 2023-09-05 DIAGNOSIS — I25.10 CORONARY ARTERY DISEASE INVOLVING NATIVE CORONARY ARTERY OF NATIVE HEART WITHOUT ANGINA PECTORIS: ICD-10-CM

## 2023-09-06 RX ORDER — CLOPIDOGREL BISULFATE 75 MG/1
TABLET ORAL
Qty: 80 TABLET | Refills: 1 | Status: SHIPPED | OUTPATIENT
Start: 2023-09-06

## 2023-09-06 NOTE — TELEPHONE ENCOUNTER
Medication:   Requested Prescriptions     Pending Prescriptions Disp Refills    clopidogrel (PLAVIX) 75 MG tablet [Pharmacy Med Name: Clopidogrel Bisulfate 75 MG Oral Tablet] 80 tablet 3     Sig: TAKE 1 TABLET BY MOUTH DAILY        Last Filled: 02/21/2023 #90 with 1 refill     Patient Phone Number: [phone] (home)     Last appt: 5/4/2023   Next appt: 9/7/2023    Last OARRS: No flowsheet data found.

## 2023-10-04 RX ORDER — FINASTERIDE 5 MG/1
5 TABLET, FILM COATED ORAL DAILY
Qty: 100 TABLET | Refills: 0 | Status: SHIPPED | OUTPATIENT
Start: 2023-10-04

## 2023-10-04 NOTE — TELEPHONE ENCOUNTER
Patient sent a [employer] message stating that her PMD today her that Cardiology needs to refill the lasix 20 mg daily.  Per chart, Dr. Yepez/Martha NP/Alia NP has never prescribed Lasix 20 mg or mentioned it in OV notes.  On 9/13/2016, Dr. Rodrigues with [facility] prescribed Lasix 20 mg for diastolic CHF and it had been refilled at that office.  Will route to Dr. Yepez to see if he is ok with writer refilling Lasix 20 mg daily.     Last BMP 3/2017 Cr 0.76, BUN 16 GFR 74.    Pt called back and refused an appt   Stated he did not need one and would call back when he felt one was needed

## 2023-10-04 NOTE — TELEPHONE ENCOUNTER
Medication:   Requested Prescriptions     Pending Prescriptions Disp Refills    finasteride (PROSCAR) 5 MG tablet [Pharmacy Med Name: Finasteride 5 MG Oral Tablet] 10 tablet 35     Sig: TAKE 1 TABLET BY MOUTH DAILY        Last Filled: 03/15/2023 #100 with 1 refill     Patient Phone Number: [phone] (home)     Last appt: 5/4/2023   Next appt: Return in about 4 months (around 9/4/2023).     Last OARRS:        No data to display

## 2023-10-16 DIAGNOSIS — I25.10 CORONARY ARTERY DISEASE INVOLVING NATIVE CORONARY ARTERY OF NATIVE HEART WITHOUT ANGINA PECTORIS: ICD-10-CM

## 2023-10-16 DIAGNOSIS — I65.23 BILATERAL CAROTID ARTERY STENOSIS: ICD-10-CM

## 2023-10-17 RX ORDER — CLOPIDOGREL BISULFATE 75 MG/1
TABLET ORAL
Qty: 100 TABLET | Refills: 2 | OUTPATIENT
Start: 2023-10-17

## 2023-10-17 NOTE — TELEPHONE ENCOUNTER
LVM for patient to call to schedule appointment    Medication:   Requested Prescriptions     Pending Prescriptions Disp Refills    clopidogrel (PLAVIX) 75 MG tablet [Pharmacy Med Name: Clopidogrel Bisulfate 75 MG Oral Tablet] 100 tablet 2     Sig: TAKE 1 TABLET BY MOUTH DAILY        Last Filled:  9/6/2023 #80 w/ 1 refill, not due for refill    Patient Phone Number: [phone] (home)     Last appt: 5/4/2023  Followup office visit in 4 months  Next appt: Visit date not found    Last OARRS:        No data to display

## 2023-11-04 DIAGNOSIS — I73.9 PVD (PERIPHERAL VASCULAR DISEASE) WITH CLAUDICATION (HCC): ICD-10-CM

## 2023-11-06 RX ORDER — CILOSTAZOL 50 MG/1
50 TABLET ORAL
Qty: 160 TABLET | Refills: 1 | Status: SHIPPED | OUTPATIENT
Start: 2023-11-06

## 2023-11-06 NOTE — TELEPHONE ENCOUNTER
Medication:   Requested Prescriptions     Pending Prescriptions Disp Refills    cilostazol (PLETAL) 50 MG tablet [Pharmacy Med Name: CILOSTAZOL  50MG  TAB] 160 tablet 3     Sig: TAKE 1 TABLET BY MOUTH TWICE  DAILY BEFORE MEALS        Last Filled:  04/17/2023 #180 with 1 refill     Patient Phone Number: [phone] (home)     Last appt: 5/4/2023   Next appt: 11/8/2023    Last OARRS:        No data to display

## 2023-11-08 ENCOUNTER — OFFICE VISIT (OUTPATIENT)
Dept: PRIMARY CARE CLINIC | Age: 88
End: 2023-11-08
Payer: MEDICARE

## 2023-11-08 VITALS
HEART RATE: 71 BPM | HEIGHT: 68 IN | OXYGEN SATURATION: 97 % | TEMPERATURE: 98.2 F | SYSTOLIC BLOOD PRESSURE: 124 MMHG | RESPIRATION RATE: 18 BRPM | WEIGHT: 151 LBS | BODY MASS INDEX: 22.88 KG/M2 | DIASTOLIC BLOOD PRESSURE: 60 MMHG

## 2023-11-08 DIAGNOSIS — I25.10 CORONARY ARTERY DISEASE INVOLVING NATIVE CORONARY ARTERY OF NATIVE HEART WITHOUT ANGINA PECTORIS: Primary | ICD-10-CM

## 2023-11-08 DIAGNOSIS — K21.9 GASTROESOPHAGEAL REFLUX DISEASE WITHOUT ESOPHAGITIS: ICD-10-CM

## 2023-11-08 DIAGNOSIS — I65.23 BILATERAL CAROTID ARTERY STENOSIS: ICD-10-CM

## 2023-11-08 DIAGNOSIS — I73.9 PVD (PERIPHERAL VASCULAR DISEASE) WITH CLAUDICATION (HCC): ICD-10-CM

## 2023-11-08 DIAGNOSIS — I38 VALVULAR HEART DISEASE: ICD-10-CM

## 2023-11-08 PROCEDURE — G8484 FLU IMMUNIZE NO ADMIN: HCPCS | Performed by: FAMILY MEDICINE

## 2023-11-08 PROCEDURE — G8427 DOCREV CUR MEDS BY ELIG CLIN: HCPCS | Performed by: FAMILY MEDICINE

## 2023-11-08 PROCEDURE — 1123F ACP DISCUSS/DSCN MKR DOCD: CPT | Performed by: FAMILY MEDICINE

## 2023-11-08 PROCEDURE — 1036F TOBACCO NON-USER: CPT | Performed by: FAMILY MEDICINE

## 2023-11-08 PROCEDURE — G8420 CALC BMI NORM PARAMETERS: HCPCS | Performed by: FAMILY MEDICINE

## 2023-11-08 PROCEDURE — 99214 OFFICE O/P EST MOD 30 MIN: CPT | Performed by: FAMILY MEDICINE

## 2023-11-08 ASSESSMENT — ENCOUNTER SYMPTOMS
NAUSEA: 0
SORE THROAT: 0
SHORTNESS OF BREATH: 0
VOMITING: 0
COUGH: 0
BLOOD IN STOOL: 0
ABDOMINAL PAIN: 0
TROUBLE SWALLOWING: 1

## 2023-11-08 NOTE — PROGRESS NOTES
clinically stable on present medications and denies any intermittent claudication     2. Bilateral carotid artery stenosis  Patient s/p L carotid endarterectomy and is clinically stable on present medications and with a nonfocal neuro exam and will check fasting bloodwork  - Lipid Panel; Future    3. Coronary artery disease involving native coronary artery of native heart without angina pectoris/Valvular heart disease  Pt is followed by Cardiology and is clinically stable on present medications and denies any CP or SOB and is with no signs of heart failure on PE  - Comprehensive Metabolic Panel; Future  - TSH with Reflex; Future  - Lipid Panel; Future    4. Gastroesophageal reflux disease without esophagitis  Pt clinically stable on PPI medication and is with a nontender abdomen on PE   - CBC with Auto Differential; Future        Wilmer Boyce MD      Return in about 6 months (around 5/8/2024). Patient Instructions     Go to the ER ASAP if you develop any chest pain, shortness of breath, leg swelling, facial droop, slurred speech, weakness/numbness in your arms or legs or double vision!

## 2023-11-08 NOTE — PATIENT INSTRUCTIONS
Go to the ER ASAP if you develop any chest pain, shortness of breath, leg swelling, facial droop, slurred speech, weakness/numbness in your arms or legs or double vision!

## 2023-11-13 ENCOUNTER — TELEPHONE (OUTPATIENT)
Dept: PRIMARY CARE CLINIC | Age: 88
End: 2023-11-13

## 2023-11-13 DIAGNOSIS — K21.9 GASTROESOPHAGEAL REFLUX DISEASE WITHOUT ESOPHAGITIS: ICD-10-CM

## 2023-11-13 DIAGNOSIS — I65.23 BILATERAL CAROTID ARTERY STENOSIS: ICD-10-CM

## 2023-11-13 DIAGNOSIS — I25.10 CORONARY ARTERY DISEASE INVOLVING NATIVE CORONARY ARTERY OF NATIVE HEART WITHOUT ANGINA PECTORIS: ICD-10-CM

## 2023-11-13 LAB
ALBUMIN SERPL-MCNC: 4.1 G/DL (ref 3.4–5)
ALBUMIN/GLOB SERPL: 1.8 {RATIO} (ref 1.1–2.2)
ALP SERPL-CCNC: 138 U/L (ref 40–129)
ALT SERPL-CCNC: 7 U/L (ref 10–40)
ANION GAP SERPL CALCULATED.3IONS-SCNC: 8 MMOL/L (ref 3–16)
AST SERPL-CCNC: 11 U/L (ref 15–37)
BASOPHILS # BLD: 0 K/UL (ref 0–0.2)
BASOPHILS NFR BLD: 0.3 %
BILIRUB SERPL-MCNC: 0.5 MG/DL (ref 0–1)
BUN SERPL-MCNC: 14 MG/DL (ref 7–20)
CALCIUM SERPL-MCNC: 9.5 MG/DL (ref 8.3–10.6)
CHLORIDE SERPL-SCNC: 107 MMOL/L (ref 99–110)
CHOLEST SERPL-MCNC: 156 MG/DL (ref 0–199)
CO2 SERPL-SCNC: 27 MMOL/L (ref 21–32)
CREAT SERPL-MCNC: 1 MG/DL (ref 0.8–1.3)
DEPRECATED RDW RBC AUTO: 13.8 % (ref 12.4–15.4)
EOSINOPHIL # BLD: 0.2 K/UL (ref 0–0.6)
EOSINOPHIL NFR BLD: 3 %
GFR SERPLBLD CREATININE-BSD FMLA CKD-EPI: >60 ML/MIN/{1.73_M2}
GLUCOSE SERPL-MCNC: 93 MG/DL (ref 70–99)
HCT VFR BLD AUTO: 39.7 % (ref 40.5–52.5)
HDLC SERPL-MCNC: 53 MG/DL (ref 40–60)
HGB BLD-MCNC: 13.1 G/DL (ref 13.5–17.5)
LDLC SERPL CALC-MCNC: 92 MG/DL
LYMPHOCYTES # BLD: 2 K/UL (ref 1–5.1)
LYMPHOCYTES NFR BLD: 24.5 %
MCH RBC QN AUTO: 29.8 PG (ref 26–34)
MCHC RBC AUTO-ENTMCNC: 33 G/DL (ref 31–36)
MCV RBC AUTO: 90.1 FL (ref 80–100)
MONOCYTES # BLD: 0.9 K/UL (ref 0–1.3)
MONOCYTES NFR BLD: 10.8 %
NEUTROPHILS # BLD: 5 K/UL (ref 1.7–7.7)
NEUTROPHILS NFR BLD: 61.4 %
PLATELET # BLD AUTO: 192 K/UL (ref 135–450)
PMV BLD AUTO: 10.2 FL (ref 5–10.5)
POTASSIUM SERPL-SCNC: 4.2 MMOL/L (ref 3.5–5.1)
PROT SERPL-MCNC: 6.4 G/DL (ref 6.4–8.2)
RBC # BLD AUTO: 4.41 M/UL (ref 4.2–5.9)
SODIUM SERPL-SCNC: 142 MMOL/L (ref 136–145)
TRIGL SERPL-MCNC: 54 MG/DL (ref 0–150)
TSH SERPL DL<=0.005 MIU/L-ACNC: 2.07 UIU/ML (ref 0.27–4.2)
VLDLC SERPL CALC-MCNC: 11 MG/DL
WBC # BLD AUTO: 8.2 K/UL (ref 4–11)

## 2023-11-13 RX ORDER — TAMSULOSIN HYDROCHLORIDE 0.4 MG/1
0.4 CAPSULE ORAL 2 TIMES DAILY
Qty: 200 CAPSULE | Refills: 1 | Status: SHIPPED | OUTPATIENT
Start: 2023-11-13

## 2023-11-13 NOTE — TELEPHONE ENCOUNTER
Medication:   Requested Prescriptions     Pending Prescriptions Disp Refills    tamsulosin (FLOMAX) 0.4 MG capsule [Pharmacy Med Name: Tamsulosin HCl 0.4 MG Oral Capsule] 200 capsule 2     Sig: TAKE 1 CAPSULE BY MOUTH IN THE  MORNING AND AT BEDTIME       Last Filled: 08/21/2023 #180 with 1 refill     Patient Phone Number: [phone] (home)     Last appt: 11/8/2023   Next appt: 5/8/2024    Last PSA: No results found for: \"PSA\"

## 2023-11-13 NOTE — TELEPHONE ENCOUNTER
Pt called in stating he had an appt 11/8/23 and Dr. Federico Yu said he was going to give the patient medication for his ear itching     Pt would like a call back with what the medication is called and where it was sent to

## 2023-11-14 RX ORDER — NEOMYCIN SULFATE, POLYMYXIN B SULFATE AND HYDROCORTISONE 10; 3.5; 1 MG/ML; MG/ML; [USP'U]/ML
3 SUSPENSION/ DROPS AURICULAR (OTIC) 2 TIMES DAILY
Qty: 10 ML | Refills: 0 | Status: SHIPPED | OUTPATIENT
Start: 2023-11-14 | End: 2023-11-21

## 2023-11-14 NOTE — TELEPHONE ENCOUNTER
Goal Outcome Evaluation:       [id number]    Alert to self and place. VSS on RA. Blood sugar checks. Incontinent B/B. Reddened margret area. Takes pills whole w/ water. Refused to get oob. Denies pain. [facility] LTC w/ hospice pending                  Patient notified and verbalized understanding. No further action is required for this encounter.

## 2023-11-27 RX ORDER — CARVEDILOL 12.5 MG/1
TABLET ORAL
Qty: 200 TABLET | Refills: 2 | OUTPATIENT
Start: 2023-11-27

## 2023-11-27 NOTE — TELEPHONE ENCOUNTER
Medication:   Requested Prescriptions     Pending Prescriptions Disp Refills    carvedilol (COREG) 12.5 MG tablet [Pharmacy Med Name: Carvedilol 12.5 MG Oral Tablet] 200 tablet 2     Sig: TAKE 1 TABLET BY MOUTH TWICE  DAILY        Last Filled: 03/15/2023 #200 with 1 refill     Patient Phone Number: [phone] (home)     Last appt: 11/8/2023   Next appt: 5/8/2024    Last OARRS:        No data to display

## 2023-12-13 DIAGNOSIS — I65.23 BILATERAL CAROTID ARTERY STENOSIS: ICD-10-CM

## 2023-12-13 DIAGNOSIS — I25.10 CORONARY ARTERY DISEASE INVOLVING NATIVE CORONARY ARTERY OF NATIVE HEART WITHOUT ANGINA PECTORIS: ICD-10-CM

## 2023-12-13 RX ORDER — CLOPIDOGREL BISULFATE 75 MG/1
TABLET ORAL
Qty: 90 TABLET | Refills: 1 | Status: SHIPPED | OUTPATIENT
Start: 2023-12-13

## 2023-12-13 NOTE — TELEPHONE ENCOUNTER
Medication:   Requested Prescriptions     Pending Prescriptions Disp Refills    clopidogrel (PLAVIX) 75 MG tablet [Pharmacy Med Name: Clopidogrel Bisulfate 75 MG Oral Tablet] 60 tablet 5     Sig: TAKE 1 TABLET BY MOUTH DAILY        Last Filled:  09/06/2023, #80, 1 refill    Patient Phone Number: [phone] (home)     Last appt: 11/8/2023   Next appt: 5/8/2024    Last OARRS:        No data to display

## 2023-12-27 RX ORDER — FINASTERIDE 5 MG/1
5 TABLET, FILM COATED ORAL DAILY
Qty: 100 TABLET | Refills: 0 | Status: SHIPPED | OUTPATIENT
Start: 2023-12-27

## 2024-01-09 ENCOUNTER — TELEPHONE (OUTPATIENT)
Dept: PRIMARY CARE CLINIC | Age: 89
End: 2024-01-09

## 2024-01-09 RX ORDER — CARVEDILOL 12.5 MG/1
TABLET ORAL
Qty: 200 TABLET | Refills: 1 | Status: SHIPPED | OUTPATIENT
Start: 2024-01-09 | End: 2024-01-10 | Stop reason: SDUPTHER

## 2024-01-09 NOTE — TELEPHONE ENCOUNTER
Pt called in and stated that he only has 3 pills left of his carvedilol.  Asked if he would be okay to wait the week or so that it takes for his mail delivery to deliver it to his house or if he should request that the medication be sent to a local pharmacy

## 2024-01-09 NOTE — TELEPHONE ENCOUNTER
Medication:   Requested Prescriptions     Pending Prescriptions Disp Refills    carvedilol (COREG) 12.5 MG tablet [Pharmacy Med Name: Carvedilol 12.5 MG Oral Tablet] 200 tablet 2     Sig: TAKE 1 TABLET BY MOUTH TWICE  DAILY        Last Filled: 03/15/2023 #200 with 1 refill     Patient Phone Number: [phone] (home)     Last appt: 11/8/2023   Next appt: 5/8/2024    Last OARRS:        No data to display

## 2024-01-10 RX ORDER — CARVEDILOL 12.5 MG/1
12.5 TABLET ORAL 2 TIMES DAILY
Qty: 30 TABLET | Refills: 0 | Status: SHIPPED | OUTPATIENT
Start: 2024-01-10

## 2024-01-19 ENCOUNTER — TELEPHONE (OUTPATIENT)
Dept: PRIMARY CARE CLINIC | Age: 89
End: 2024-01-19

## 2024-01-19 DIAGNOSIS — I73.9 PVD (PERIPHERAL VASCULAR DISEASE) WITH CLAUDICATION (HCC): ICD-10-CM

## 2024-01-19 NOTE — TELEPHONE ENCOUNTER
Pt called in stating he would like to talk to  regarding a GI problem.       Pt stated it has [place] going on for a couple of weeks, Started off as just diarrhea. Pt stated it has \"gotten different\". diarrhea.  Pt stated he is still having diarrhea type symptoms to a different color- Clear opaque

## 2024-01-22 ENCOUNTER — OFFICE VISIT (OUTPATIENT)
Dept: PRIMARY CARE CLINIC | Age: 89
End: 2024-01-22

## 2024-01-22 VITALS
SYSTOLIC BLOOD PRESSURE: 108 MMHG | BODY MASS INDEX: 23.04 KG/M2 | RESPIRATION RATE: 16 BRPM | HEIGHT: 68 IN | OXYGEN SATURATION: 98 % | WEIGHT: 152 LBS | TEMPERATURE: 98.1 F | DIASTOLIC BLOOD PRESSURE: 60 MMHG | HEART RATE: 71 BPM

## 2024-01-22 DIAGNOSIS — K21.9 GASTROESOPHAGEAL REFLUX DISEASE WITHOUT ESOPHAGITIS: ICD-10-CM

## 2024-01-22 DIAGNOSIS — R19.7 DIARRHEA, UNSPECIFIED TYPE: Primary | ICD-10-CM

## 2024-01-22 RX ORDER — CILOSTAZOL 50 MG/1
50 TABLET ORAL
Qty: 200 TABLET | Refills: 2 | OUTPATIENT
Start: 2024-01-22

## 2024-01-22 ASSESSMENT — ENCOUNTER SYMPTOMS
BLOOD IN STOOL: 0
SHORTNESS OF BREATH: 0
NAUSEA: 0
ABDOMINAL PAIN: 0
TROUBLE SWALLOWING: 0
SORE THROAT: 0
COUGH: 0
DIARRHEA: 0
VOMITING: 0

## 2024-01-22 ASSESSMENT — PATIENT HEALTH QUESTIONNAIRE - PHQ9
SUM OF ALL RESPONSES TO PHQ QUESTIONS 1-9: 0
1. LITTLE INTEREST OR PLEASURE IN DOING THINGS: 0
2. FEELING DOWN, DEPRESSED OR HOPELESS: 0
SUM OF ALL RESPONSES TO PHQ QUESTIONS 1-9: 0
SUM OF ALL RESPONSES TO PHQ QUESTIONS 1-9: 0
SUM OF ALL RESPONSES TO PHQ9 QUESTIONS 1 & 2: 0
SUM OF ALL RESPONSES TO PHQ QUESTIONS 1-9: 0

## 2024-01-22 NOTE — PROGRESS NOTES
Cardiovascular:  Negative for chest pain, palpitations and leg swelling.   Gastrointestinal:  Negative for abdominal pain, blood in stool, diarrhea, nausea and vomiting.        Negative melena or indigestion   Endocrine: Positive for polydipsia. Negative for polyuria.   Genitourinary:  Negative for dysuria and hematuria.   Musculoskeletal:  Negative for arthralgias.   Skin:  Negative for rash.   Neurological:  Negative for dizziness, seizures, syncope, speech difficulty, weakness and headaches.   Psychiatric/Behavioral:  Negative for dysphoric mood. The patient is not nervous/anxious.          Vitals:    01/22/24 1057   BP: 108/60   Pulse: 71   Resp: 16   Temp: 98.1 °F (36.7 °C)   SpO2: 98%         Physical Exam  Vitals reviewed.   Constitutional:       General: He is not in acute distress.  HENT:      Mouth/Throat:      Mouth: Mucous membranes are moist.      Pharynx: Oropharynx is clear.   Eyes:      General: No scleral icterus.     Comments: Pink conjunctivae    Neck:      Thyroid: No thyromegaly.   Cardiovascular:      Heart sounds: Murmur (2/6 systolic with radiation into B/L carotids) heard.      No friction rub. No gallop.   Pulmonary:      Effort: Pulmonary effort is normal.      Breath sounds: Normal breath sounds.   Abdominal:      Palpations: Abdomen is soft.      Tenderness: There is no abdominal tenderness.   Musculoskeletal:      Comments: No leg edema noted B/L   Lymphadenopathy:      Cervical: No cervical adenopathy.   Skin:     Findings: No rash.   Neurological:      Mental Status: He is alert.   Psychiatric:         Mood and Affect: Mood normal.         ASSESSMENT AND PLAN    1. Diarrhea, unspecified type  Pt reports episodes over the past 2 weeks but denies any associated nausea/vomiting or blood in stool and is with a nontender abdomen on PE and most likely was viral in nature. Pt reports that his symptoms are resolving and was told to eat a yogurt daily x 1 week    2. Gastroesophageal reflux

## 2024-01-22 NOTE — TELEPHONE ENCOUNTER
Medication:   Requested Prescriptions     Pending Prescriptions Disp Refills    cilostazol (PLETAL) 50 MG tablet [Pharmacy Med Name: CILOSTAZOL  50MG  TAB] 200 tablet 2     Sig: TAKE 1 TABLET BY MOUTH TWICE  DAILY BEFORE MEALS        Last Filled: 11/06/2023 #160 with 1 refill     Patient Phone Number: [phone] (home)     Last appt: 11/8/2023   Next appt: 1/22/2024    Last OARRS:        No data to display

## 2024-03-01 DIAGNOSIS — I65.23 BILATERAL CAROTID ARTERY STENOSIS: ICD-10-CM

## 2024-03-01 DIAGNOSIS — I25.10 CORONARY ARTERY DISEASE INVOLVING NATIVE CORONARY ARTERY OF NATIVE HEART WITHOUT ANGINA PECTORIS: ICD-10-CM

## 2024-03-04 RX ORDER — CLOPIDOGREL BISULFATE 75 MG/1
TABLET ORAL
Qty: 100 TABLET | Refills: 2 | OUTPATIENT
Start: 2024-03-04

## 2024-03-04 NOTE — TELEPHONE ENCOUNTER
Medication:   Requested Prescriptions     Pending Prescriptions Disp Refills    clopidogrel (PLAVIX) 75 MG tablet [Pharmacy Med Name: Clopidogrel Bisulfate 75 MG Oral Tablet] 100 tablet 2     Sig: TAKE 1 TABLET BY MOUTH DAILY        Last Filled: 12/13/2023 #90 with 1 refill     Patient Phone Number: [phone] (home)     Last appt: 1/22/2024   Next appt: 5/8/2024    Last OARRS:        No data to display

## 2024-03-16 DIAGNOSIS — I73.9 PVD (PERIPHERAL VASCULAR DISEASE) WITH CLAUDICATION (HCC): ICD-10-CM

## 2024-03-18 RX ORDER — FINASTERIDE 5 MG/1
5 TABLET, FILM COATED ORAL DAILY
Qty: 100 TABLET | Refills: 1 | Status: SHIPPED | OUTPATIENT
Start: 2024-03-18

## 2024-03-18 RX ORDER — CILOSTAZOL 50 MG/1
50 TABLET ORAL
Qty: 120 TABLET | Refills: 1 | Status: SHIPPED | OUTPATIENT
Start: 2024-03-18

## 2024-03-18 NOTE — TELEPHONE ENCOUNTER
Medication:   Requested Prescriptions     Pending Prescriptions Disp Refills    cilostazol (PLETAL) 50 MG tablet [Pharmacy Med Name: CILOSTAZOL  50MG  TAB] 120 tablet 5     Sig: TAKE 1 TABLET BY MOUTH TWICE  DAILY BEFORE MEALS        Last Filled: 11/06/2023 #160 with 1 refill     Patient Phone Number: [phone] (home)     Last appt: 1/22/2024   Next appt: 3/18/2024    Last OARRS:        No data to display

## 2024-03-18 NOTE — TELEPHONE ENCOUNTER
Medication:   Requested Prescriptions     Pending Prescriptions Disp Refills    finasteride (PROSCAR) 5 MG tablet [Pharmacy Med Name: Finasteride 5 MG Oral Tablet] 100 tablet 2     Sig: TAKE 1 TABLET BY MOUTH DAILY        Last Filled:  12/27/2023 #100 with 0 refills     Patient Phone Number: [phone] (home)     Last appt: 1/22/2024   Next appt: 5/9/2024    Last OARRS:        No data to display

## 2024-04-16 DIAGNOSIS — R10.13 EPIGASTRIC PAIN: ICD-10-CM

## 2024-04-16 DIAGNOSIS — K21.9 GASTROESOPHAGEAL REFLUX DISEASE WITHOUT ESOPHAGITIS: ICD-10-CM

## 2024-04-16 RX ORDER — LANSOPRAZOLE 30 MG/1
30 CAPSULE, DELAYED RELEASE ORAL DAILY
Qty: 80 CAPSULE | Refills: 3 | OUTPATIENT
Start: 2024-04-16

## 2024-04-16 NOTE — TELEPHONE ENCOUNTER
Medication:   Requested Prescriptions     Pending Prescriptions Disp Refills    lansoprazole (PREVACID) 30 MG delayed release capsule [Pharmacy Med Name: Lansoprazole 30 MG Oral Capsule Delayed Release] 80 capsule 3     Sig: TAKE 1 CAPSULE BY MOUTH DAILY        Last Filled:  2/21/2023 # 90 refills 1    Patient Phone Number: [phone] (home)     Last appt: 1/22/2024   Next appt: 5/9/2024    Last OARRS:        No data to display

## 2024-04-25 DIAGNOSIS — R10.13 EPIGASTRIC PAIN: ICD-10-CM

## 2024-04-25 DIAGNOSIS — K21.9 GASTROESOPHAGEAL REFLUX DISEASE WITHOUT ESOPHAGITIS: ICD-10-CM

## 2024-04-25 RX ORDER — LANSOPRAZOLE 30 MG/1
CAPSULE, DELAYED RELEASE ORAL
Qty: 90 CAPSULE | Refills: 0 | OUTPATIENT
Start: 2024-04-25

## 2024-04-25 RX ORDER — LANSOPRAZOLE 30 MG/1
30 CAPSULE, DELAYED RELEASE ORAL DAILY
Qty: 90 CAPSULE | Refills: 1 | Status: SHIPPED | OUTPATIENT
Start: 2024-04-25

## 2024-04-25 NOTE — TELEPHONE ENCOUNTER
Medication:   Requested Prescriptions     Pending Prescriptions Disp Refills    lansoprazole (PREVACID) 30 MG delayed release capsule [Pharmacy Med Name: LANSOPRAZOLE DR CAP] 90 capsule 0     Si tablet by mouth daily        Last Filled: 2023 # 90 with 1 refill     Patient Phone Number: [phone] (home)     Last appt: 2024   Next appt: 2024    Last OARRS:        No data to display

## 2024-04-25 NOTE — TELEPHONE ENCOUNTER
Medication:   Requested Prescriptions     Pending Prescriptions Disp Refills    lansoprazole (PREVACID) 30 MG delayed release capsule 90 capsule 1     Sig: Take 1 capsule by mouth daily        Last Filled:  02/21/2023 # 90 refills 1    Patient Phone Number: [phone] (home)     Last appt: 1/22/2024   Next appt: 5/9/2024    Last OARRS:        No data to display

## 2024-05-06 RX ORDER — TAMSULOSIN HYDROCHLORIDE 0.4 MG/1
0.4 CAPSULE ORAL 2 TIMES DAILY
Qty: 200 CAPSULE | Refills: 1 | Status: SHIPPED | OUTPATIENT
Start: 2024-05-06

## 2024-05-06 NOTE — TELEPHONE ENCOUNTER
Medication:   Requested Prescriptions     Pending Prescriptions Disp Refills    tamsulosin (FLOMAX) 0.4 MG capsule [Pharmacy Med Name: Tamsulosin HCl 0.4 MG Oral Capsule] 200 capsule 2     Sig: TAKE 1 CAPSULE BY MOUTH IN THE  MORNING AND AT BEDTIME        Last Filled:  11/13/23 #200 with 1 refill     Patient Phone Number: [phone] (home)     Last appt: 1/22/2024   Next appt: 5/9/2024    Last OARRS:        No data to display

## 2024-05-09 ENCOUNTER — OFFICE VISIT (OUTPATIENT)
Dept: PRIMARY CARE CLINIC | Age: 89
End: 2024-05-09
Payer: MEDICARE

## 2024-05-09 VITALS
SYSTOLIC BLOOD PRESSURE: 152 MMHG | OXYGEN SATURATION: 96 % | RESPIRATION RATE: 16 BRPM | BODY MASS INDEX: 22.85 KG/M2 | WEIGHT: 150.8 LBS | HEART RATE: 59 BPM | HEIGHT: 68 IN | DIASTOLIC BLOOD PRESSURE: 78 MMHG | TEMPERATURE: 99 F

## 2024-05-09 DIAGNOSIS — K21.9 GASTROESOPHAGEAL REFLUX DISEASE WITHOUT ESOPHAGITIS: ICD-10-CM

## 2024-05-09 DIAGNOSIS — N40.0 BENIGN PROSTATIC HYPERPLASIA WITHOUT LOWER URINARY TRACT SYMPTOMS: ICD-10-CM

## 2024-05-09 DIAGNOSIS — I25.10 CORONARY ARTERY DISEASE INVOLVING NATIVE CORONARY ARTERY OF NATIVE HEART WITHOUT ANGINA PECTORIS: ICD-10-CM

## 2024-05-09 DIAGNOSIS — Z00.00 MEDICARE ANNUAL WELLNESS VISIT, SUBSEQUENT: Primary | ICD-10-CM

## 2024-05-09 DIAGNOSIS — I38 VALVULAR HEART DISEASE: ICD-10-CM

## 2024-05-09 DIAGNOSIS — I73.9 PVD (PERIPHERAL VASCULAR DISEASE) WITH CLAUDICATION (HCC): ICD-10-CM

## 2024-05-09 DIAGNOSIS — I65.23 BILATERAL CAROTID ARTERY STENOSIS: ICD-10-CM

## 2024-05-09 PROCEDURE — 1123F ACP DISCUSS/DSCN MKR DOCD: CPT | Performed by: FAMILY MEDICINE

## 2024-05-09 PROCEDURE — G0439 PPPS, SUBSEQ VISIT: HCPCS | Performed by: FAMILY MEDICINE

## 2024-05-09 RX ORDER — NEOMYCIN SULFATE, POLYMYXIN B SULFATE AND HYDROCORTISONE 10; 3.5; 1 MG/ML; MG/ML; [USP'U]/ML
3 SUSPENSION/ DROPS AURICULAR (OTIC) 2 TIMES DAILY
Qty: 10 ML | Refills: 0 | Status: SHIPPED | OUTPATIENT
Start: 2024-05-09 | End: 2024-05-16

## 2024-05-09 SDOH — ECONOMIC STABILITY: INCOME INSECURITY: HOW HARD IS IT FOR YOU TO PAY FOR THE VERY BASICS LIKE FOOD, HOUSING, MEDICAL CARE, AND HEATING?: NOT HARD AT ALL

## 2024-05-09 SDOH — ECONOMIC STABILITY: FOOD INSECURITY: WITHIN THE PAST 12 MONTHS, YOU WORRIED THAT YOUR FOOD WOULD RUN OUT BEFORE YOU GOT MONEY TO BUY MORE.: NEVER TRUE

## 2024-05-09 SDOH — ECONOMIC STABILITY: FOOD INSECURITY: WITHIN THE PAST 12 MONTHS, THE FOOD YOU BOUGHT JUST DIDN'T LAST AND YOU DIDN'T HAVE MONEY TO GET MORE.: NEVER TRUE

## 2024-05-09 ASSESSMENT — PATIENT HEALTH QUESTIONNAIRE - PHQ9
SUM OF ALL RESPONSES TO PHQ QUESTIONS 1-9: 0
1. LITTLE INTEREST OR PLEASURE IN DOING THINGS: NOT AT ALL
SUM OF ALL RESPONSES TO PHQ9 QUESTIONS 1 & 2: 0
2. FEELING DOWN, DEPRESSED OR HOPELESS: NOT AT ALL
SUM OF ALL RESPONSES TO PHQ QUESTIONS 1-9: 0

## 2024-05-09 NOTE — TELEPHONE ENCOUNTER
Medication:   Requested Prescriptions     Pending Prescriptions Disp Refills    neomycin-polymyxin-hydrocortisone (CORTISPORIN) 3.5-25429-9 otic suspension 10 mL 0     Sig: Place 3 drops into both ears in the morning and 3 drops in the evening. Do all this for 7 days.        Last Filled:  11/14/2023 # 10 mL, refills 0    Patient Phone Number: [phone] (home)     Last appt: 5/9/2024   Next appt: Visit date not found    Last OARRS:        No data to display

## 2024-05-09 NOTE — PROGRESS NOTES
Medicare Annual Wellness Visit  Name: Guy Reyna Today’s Date: 2024   MRN: [id number] Sex: Male   Age: 89 y.o. Ethnicity: Non- / Non    : 1935 Race: White (non-)      Guy Reyna is here for Medicare AWV and Medication Refill    Patient has a hx of GERD and reports resolution of his indigestion with Rx prevacid medication and did recent abdominal US which was normal except for a simple R kidney cyst and 2 small nonspecific lesions in his liver      Patient has a past medical history significant for HTN and CAD s/p stenting  and is followed by Cardiology and is on coreg 12.5 mg BID along with plavix 75 mg daily. Pt is intolerable to statin medication. Pt did see Cardiology recently and reported sharp brief CP at times and had a cardiac stress test done which was normal     Patient did recent bloodwork [lipid panel, CBC, CMP, TSH] which was unremarkable      Pt has a hx of PVD and is on pletal medication and is followed by Vascular Surgery     Pt has a hx of carotid stenosis s/p L carotid endarterectomy 3 years ago     Pt has a hx of BPH and is on proscar and flomax medications     Pt reports a normal colonoscopy except for a benign polyp 15 years ago     Pt denies any hx of diabestes, CHF or asthma     Surgical hx includes tonsillectomy and appendectomy     Pt did not get his COVID vaccine yet but did have COVID last year with mild symptoms     Pt is an exsmoker Quit  and uses alcohol occasionally and denies any illegal drug use     FHx positive for colon cancer (mother) and lung cancer (father)    Screenings for behavioral, psychosocial and functional/safety risks, and cognitive dysfunction are all negative except as indicated below. These results, as well as other patient data from the Health Risk Assessment form, are documented in Flowsheets linked to this Encounter.    Allergies   Allergen Reactions    Atorvastatin Itching and Myalgia     Other reaction(s): Muscle

## 2024-05-09 NOTE — PATIENT INSTRUCTIONS
the bathroom with you.   Where can you learn more?  Go to https://[email or web address].net/patientEd and enter G117 to learn more about \"Preventing Falls: Care Instructions.\"  Current as of: July 17, 2023               Content Version: 14.0  © 0687-9333 [employer].   Care instructions adapted under license by [employer]. If you have questions about a medical condition or this instruction, always ask your healthcare professional. [employer] disclaims any warranty or liability for your use of this information.           Learning About Being Active as an Older Adult  Why is being active important as you get older?     Being active is one of the best things you can do for your health. And it's never too late to start. Being active--or getting active, if you aren't already--has definite benefits. It can:  Give you more energy,  Keep your mind sharp.  Improve balance to reduce your risk of falls.  Help you manage chronic illness with fewer medicines.  No matter how old you are, how fit you are, or what health problems you have, there is a form of activity that will work for you. And the more physical activity you can do, the better your overall health will be.  What kinds of activity can help you stay healthy?  Being more active will make your daily activities easier. Physical activity includes planned exercise and things you do in daily life. There are four types of activity:  Aerobic.  Doing aerobic activity makes your heart and lungs strong.  Includes walking, dancing, and gardening.  Aim for at least 2½ hours spread throughout the week.  It improves your energy and can help you sleep better.  Muscle-strengthening.  This type of activity can help maintain muscle and strengthen bones.  Includes climbing stairs, using resistance bands, and lifting or carrying heavy loads.  Aim for at least twice a week.  It can help protect the knees and other joints.  Stretching.  Stretching gives you better

## 2024-05-16 DIAGNOSIS — I25.10 CORONARY ARTERY DISEASE INVOLVING NATIVE CORONARY ARTERY OF NATIVE HEART WITHOUT ANGINA PECTORIS: ICD-10-CM

## 2024-05-16 DIAGNOSIS — I65.23 BILATERAL CAROTID ARTERY STENOSIS: ICD-10-CM

## 2024-05-17 RX ORDER — CLOPIDOGREL BISULFATE 75 MG/1
TABLET ORAL
Qty: 80 TABLET | Refills: 1 | Status: SHIPPED | OUTPATIENT
Start: 2024-05-17

## 2024-05-17 NOTE — TELEPHONE ENCOUNTER
Medication:   Requested Prescriptions     Pending Prescriptions Disp Refills    clopidogrel (PLAVIX) 75 MG tablet [Pharmacy Med Name: Clopidogrel Bisulfate 75 MG Oral Tablet] 80 tablet 3     Sig: TAKE 1 TABLET BY MOUTH DAILY        Last Filled: 12/13/2023 #90 with 1 refill     Patient Phone Number: [phone] (home)     Last appt: 5/9/2024   Next appt: 9/10/2024    Last OARRS:        No data to display

## 2024-06-04 DIAGNOSIS — K21.9 GASTROESOPHAGEAL REFLUX DISEASE WITHOUT ESOPHAGITIS: ICD-10-CM

## 2024-06-04 DIAGNOSIS — R10.13 EPIGASTRIC PAIN: ICD-10-CM

## 2024-06-05 RX ORDER — LANSOPRAZOLE 30 MG/1
30 CAPSULE, DELAYED RELEASE ORAL DAILY
Qty: 100 CAPSULE | Refills: 2 | OUTPATIENT
Start: 2024-06-05

## 2024-06-08 DIAGNOSIS — I73.9 PVD (PERIPHERAL VASCULAR DISEASE) WITH CLAUDICATION (HCC): ICD-10-CM

## 2024-06-10 RX ORDER — CILOSTAZOL 50 MG/1
50 TABLET ORAL
Qty: 200 TABLET | Refills: 1 | Status: SHIPPED | OUTPATIENT
Start: 2024-06-10

## 2024-06-10 NOTE — TELEPHONE ENCOUNTER
Medication:   Requested Prescriptions     Pending Prescriptions Disp Refills    cilostazol (PLETAL) 50 MG tablet [Pharmacy Med Name: CILOSTAZOL  50MG  TAB] 200 tablet 2     Sig: TAKE 1 TABLET BY MOUTH TWICE  DAILY BEFORE MEALS        Last Filled:  3/18/24 #120 with 1 refill     Patient Phone Number: [phone] (home)     Last appt: 5/9/2024   Next appt: 9/10/2024    Last OARRS:        No data to display

## 2024-07-03 DIAGNOSIS — R10.13 EPIGASTRIC PAIN: ICD-10-CM

## 2024-07-03 DIAGNOSIS — K21.9 GASTROESOPHAGEAL REFLUX DISEASE WITHOUT ESOPHAGITIS: ICD-10-CM

## 2024-07-03 RX ORDER — LANSOPRAZOLE 30 MG/1
30 CAPSULE, DELAYED RELEASE ORAL DAILY
Qty: 100 CAPSULE | Refills: 1 | Status: SHIPPED | OUTPATIENT
Start: 2024-07-03

## 2024-07-03 NOTE — TELEPHONE ENCOUNTER
Medication:   Requested Prescriptions     Pending Prescriptions Disp Refills    lansoprazole (PREVACID) 30 MG delayed release capsule [Pharmacy Med Name: Lansoprazole 30 MG Oral Capsule Delayed Release] 100 capsule 2     Sig: TAKE 1 CAPSULE BY MOUTH DAILY        Last Filled:  4/25/2024 # 90 refills 1    Patient Phone Number: [phone] (home)     Last appt: 5/9/2024   Next appt: 9/10/2024    Last OARRS:        No data to display

## 2024-07-05 RX ORDER — CARVEDILOL 12.5 MG/1
12.5 TABLET ORAL 2 TIMES DAILY
Qty: 200 TABLET | Refills: 1 | Status: SHIPPED | OUTPATIENT
Start: 2024-07-05

## 2024-07-05 NOTE — TELEPHONE ENCOUNTER
Medication:   Requested Prescriptions     Pending Prescriptions Disp Refills    carvedilol (COREG) 12.5 MG tablet [Pharmacy Med Name: Carvedilol 12.5 MG Oral Tablet] 200 tablet 2     Sig: TAKE 1 TABLET BY MOUTH TWICE  DAILY        Last Filled:  01/10/2024 # 30    Patient Phone Number: [phone] (home)     Last appt: 5/9/2024   Next appt: 9/10/2024    Last OARRS:        No data to display

## 2024-07-29 ENCOUNTER — TELEPHONE (OUTPATIENT)
Dept: PRIMARY CARE CLINIC | Age: 89
End: 2024-07-29

## 2024-07-29 NOTE — TELEPHONE ENCOUNTER
Pt is having a tooth pulled. He is checking Dr. Vance's recommendation to see if he should stay on his blood thinner or come off of it for the procedure?    Phone [phone]

## 2024-07-30 DIAGNOSIS — I65.23 BILATERAL CAROTID ARTERY STENOSIS: ICD-10-CM

## 2024-07-30 DIAGNOSIS — I25.10 CORONARY ARTERY DISEASE INVOLVING NATIVE CORONARY ARTERY OF NATIVE HEART WITHOUT ANGINA PECTORIS: ICD-10-CM

## 2024-07-31 RX ORDER — CLOPIDOGREL BISULFATE 75 MG/1
TABLET ORAL
Qty: 100 TABLET | Refills: 2 | Status: SHIPPED | OUTPATIENT
Start: 2024-07-31

## 2024-07-31 NOTE — TELEPHONE ENCOUNTER
Medication:   Requested Prescriptions     Pending Prescriptions Disp Refills    clopidogrel (PLAVIX) 75 MG tablet [Pharmacy Med Name: Clopidogrel Bisulfate 75 MG Oral Tablet] 100 tablet 2     Sig: TAKE 1 TABLET BY MOUTH DAILY        Last Filled:  05/17/2024 # 80 refills 1    Patient Phone Number: [phone] (home)     Last appt: 5/9/2024   Next appt: 9/10/2024    Last OARRS:        No data to display

## 2024-09-24 RX ORDER — FINASTERIDE 5 MG/1
5 TABLET, FILM COATED ORAL DAILY
Qty: 100 TABLET | Refills: 1 | Status: SHIPPED | OUTPATIENT
Start: 2024-09-24

## 2024-10-14 NOTE — TELEPHONE ENCOUNTER
Medication:   Requested Prescriptions     Pending Prescriptions Disp Refills    tamsulosin (FLOMAX) 0.4 MG capsule [Pharmacy Med Name: Tamsulosin HCl 0.4 MG Oral Capsule] 200 capsule 2     Sig: TAKE 1 CAPSULE BY MOUTH IN THE  MORNING AND AT BEDTIME        Last Filled:  05/06/2024 # 200 refills 1 ordered.    Patient Phone Number: [phone] (home)     Last appt: 5/9/2024   Next appt: Visit date not found    Last OARRS:        No data to display

## 2024-10-15 RX ORDER — TAMSULOSIN HYDROCHLORIDE 0.4 MG/1
0.4 CAPSULE ORAL 2 TIMES DAILY
Qty: 200 CAPSULE | Refills: 0 | Status: SHIPPED | OUTPATIENT
Start: 2024-10-15

## 2024-12-26 RX ORDER — CARVEDILOL 12.5 MG/1
12.5 TABLET ORAL 2 TIMES DAILY
Qty: 200 TABLET | Refills: 2 | OUTPATIENT
Start: 2024-12-26

## 2024-12-26 NOTE — TELEPHONE ENCOUNTER
Patient needs to establish with new provider     Called and spoke with patient. Patient reports that he is moving out of the area. Patient reports he will have enough medication until he can establish care elsewhere - refill not needed at this time per patient  No further action is needed for this encounter.    Medication:   Requested Prescriptions     Pending Prescriptions Disp Refills    carvedilol (COREG) 12.5 MG tablet [Pharmacy Med Name: Carvedilol 12.5 MG Oral Tablet] 200 tablet 2     Sig: TAKE 1 TABLET BY MOUTH TWICE  DAILY        Last Filled:  7/05/24    Patient Phone Number: [phone] (home)     Last appt: 5/9/2024   Next appt: Visit date not found    Last OARRS:        No data to display

## 2025-04-17 DIAGNOSIS — I65.23 BILATERAL CAROTID ARTERY STENOSIS: ICD-10-CM

## 2025-04-17 DIAGNOSIS — I25.10 CORONARY ARTERY DISEASE INVOLVING NATIVE CORONARY ARTERY OF NATIVE HEART WITHOUT ANGINA PECTORIS: ICD-10-CM

## 2025-04-18 RX ORDER — CLOPIDOGREL BISULFATE 75 MG/1
75 TABLET ORAL DAILY
Qty: 100 TABLET | Refills: 2 | OUTPATIENT
Start: 2025-04-18

## 2025-04-18 NOTE — TELEPHONE ENCOUNTER
Called patient [facility] to get him established with Dr. Burdick.    Medication:   Requested Prescriptions     Pending Prescriptions Disp Refills    clopidogrel (PLAVIX) 75 MG tablet [Pharmacy Med Name: Clopidogrel Bisulfate 75 MG Oral Tablet] 100 tablet 2     Sig: TAKE 1 TABLET BY MOUTH DAILY        Last Filled:  07/31/2024 # 100 refills 2 ordered.    Patient Phone Number: [phone] (home)     Last appt: 5/9/2024   Next appt: Visit date not found    Last OARRS:        No data to display

## 2025-04-18 NOTE — TELEPHONE ENCOUNTER
Pt lives in [place] now. New PCP. Disregard the refill request.    No further action is needed for this encounter.